# Patient Record
Sex: MALE | Race: WHITE | NOT HISPANIC OR LATINO
[De-identification: names, ages, dates, MRNs, and addresses within clinical notes are randomized per-mention and may not be internally consistent; named-entity substitution may affect disease eponyms.]

---

## 2017-04-26 ENCOUNTER — APPOINTMENT (OUTPATIENT)
Dept: CARDIOLOGY | Facility: CLINIC | Age: 77
End: 2017-04-26

## 2017-04-26 ENCOUNTER — NON-APPOINTMENT (OUTPATIENT)
Age: 77
End: 2017-04-26

## 2017-04-26 VITALS
WEIGHT: 176 LBS | DIASTOLIC BLOOD PRESSURE: 70 MMHG | BODY MASS INDEX: 23.33 KG/M2 | HEART RATE: 88 BPM | SYSTOLIC BLOOD PRESSURE: 128 MMHG | HEIGHT: 73 IN

## 2017-04-26 DIAGNOSIS — I47.2 VENTRICULAR TACHYCARDIA: ICD-10-CM

## 2017-10-18 ENCOUNTER — INPATIENT (INPATIENT)
Facility: HOSPITAL | Age: 77
LOS: 1 days | Discharge: HOME | DRG: 244 | End: 2017-10-20
Attending: INTERNAL MEDICINE | Admitting: INTERNAL MEDICINE
Payer: COMMERCIAL

## 2017-10-18 ENCOUNTER — APPOINTMENT (OUTPATIENT)
Dept: ELECTROPHYSIOLOGY | Facility: CLINIC | Age: 77
End: 2017-10-18
Payer: COMMERCIAL

## 2017-10-18 ENCOUNTER — APPOINTMENT (OUTPATIENT)
Dept: CARDIOLOGY | Facility: CLINIC | Age: 77
End: 2017-10-18
Payer: COMMERCIAL

## 2017-10-18 ENCOUNTER — NON-APPOINTMENT (OUTPATIENT)
Age: 77
End: 2017-10-18

## 2017-10-18 VITALS
HEART RATE: 58 BPM | HEIGHT: 73 IN | BODY MASS INDEX: 23.06 KG/M2 | SYSTOLIC BLOOD PRESSURE: 124 MMHG | WEIGHT: 174 LBS | DIASTOLIC BLOOD PRESSURE: 64 MMHG

## 2017-10-18 VITALS
SYSTOLIC BLOOD PRESSURE: 132 MMHG | OXYGEN SATURATION: 99 % | RESPIRATION RATE: 18 BRPM | DIASTOLIC BLOOD PRESSURE: 65 MMHG | TEMPERATURE: 98 F | HEART RATE: 49 BPM | WEIGHT: 173.94 LBS | HEIGHT: 73 IN

## 2017-10-18 VITALS — HEART RATE: 48 BPM | SYSTOLIC BLOOD PRESSURE: 134 MMHG | OXYGEN SATURATION: 99 % | DIASTOLIC BLOOD PRESSURE: 59 MMHG

## 2017-10-18 DIAGNOSIS — T82.111A BREAKDOWN (MECHANICAL) OF CARDIAC PULSE GENERATOR (BATTERY), INITIAL ENCOUNTER: ICD-10-CM

## 2017-10-18 LAB
ALBUMIN SERPL ELPH-MCNC: 4.5 G/DL — SIGNIFICANT CHANGE UP (ref 3.3–5)
ALP SERPL-CCNC: 64 U/L — SIGNIFICANT CHANGE UP (ref 40–120)
ALT FLD-CCNC: 37 U/L RC — SIGNIFICANT CHANGE UP (ref 10–45)
ANION GAP SERPL CALC-SCNC: 14 MMOL/L — SIGNIFICANT CHANGE UP (ref 5–17)
APTT BLD: 31.7 SEC — SIGNIFICANT CHANGE UP (ref 27.5–37.4)
AST SERPL-CCNC: 44 U/L — HIGH (ref 10–40)
BASOPHILS # BLD AUTO: 0.1 K/UL — SIGNIFICANT CHANGE UP (ref 0–0.2)
BASOPHILS NFR BLD AUTO: 1.1 % — SIGNIFICANT CHANGE UP (ref 0–2)
BILIRUB SERPL-MCNC: 1.5 MG/DL — HIGH (ref 0.2–1.2)
BLD GP AB SCN SERPL QL: NEGATIVE — SIGNIFICANT CHANGE UP
BUN SERPL-MCNC: 22 MG/DL — SIGNIFICANT CHANGE UP (ref 7–23)
CALCIUM SERPL-MCNC: 10.3 MG/DL — SIGNIFICANT CHANGE UP (ref 8.4–10.5)
CHLORIDE SERPL-SCNC: 102 MMOL/L — SIGNIFICANT CHANGE UP (ref 96–108)
CK MB CFR SERPL CALC: 2.4 NG/ML — SIGNIFICANT CHANGE UP (ref 0–6.7)
CK SERPL-CCNC: 72 U/L — SIGNIFICANT CHANGE UP (ref 30–200)
CO2 SERPL-SCNC: 24 MMOL/L — SIGNIFICANT CHANGE UP (ref 22–31)
CREAT SERPL-MCNC: 1.41 MG/DL — HIGH (ref 0.5–1.3)
EOSINOPHIL # BLD AUTO: 0.5 K/UL — SIGNIFICANT CHANGE UP (ref 0–0.5)
EOSINOPHIL NFR BLD AUTO: 7 % — HIGH (ref 0–6)
GLUCOSE SERPL-MCNC: 171 MG/DL — HIGH (ref 70–99)
HCT VFR BLD CALC: 43.3 % — SIGNIFICANT CHANGE UP (ref 39–50)
HGB BLD-MCNC: 14.8 G/DL — SIGNIFICANT CHANGE UP (ref 13–17)
INR BLD: 1.17 RATIO — HIGH (ref 0.88–1.16)
LYMPHOCYTES # BLD AUTO: 1.5 K/UL — SIGNIFICANT CHANGE UP (ref 1–3.3)
LYMPHOCYTES # BLD AUTO: 20.5 % — SIGNIFICANT CHANGE UP (ref 13–44)
MAGNESIUM SERPL-MCNC: 2 MG/DL — SIGNIFICANT CHANGE UP (ref 1.6–2.6)
MCHC RBC-ENTMCNC: 34.2 GM/DL — SIGNIFICANT CHANGE UP (ref 32–36)
MCHC RBC-ENTMCNC: 34.3 PG — HIGH (ref 27–34)
MCV RBC AUTO: 100 FL — SIGNIFICANT CHANGE UP (ref 80–100)
MONOCYTES # BLD AUTO: 0.6 K/UL — SIGNIFICANT CHANGE UP (ref 0–0.9)
MONOCYTES NFR BLD AUTO: 8.7 % — SIGNIFICANT CHANGE UP (ref 2–14)
NEUTROPHILS # BLD AUTO: 4.7 K/UL — SIGNIFICANT CHANGE UP (ref 1.8–7.4)
NEUTROPHILS NFR BLD AUTO: 62.8 % — SIGNIFICANT CHANGE UP (ref 43–77)
PHOSPHATE SERPL-MCNC: 3.4 MG/DL — SIGNIFICANT CHANGE UP (ref 2.5–4.5)
PLATELET # BLD AUTO: 94 K/UL — LOW (ref 150–400)
POTASSIUM SERPL-MCNC: 4.9 MMOL/L — SIGNIFICANT CHANGE UP (ref 3.5–5.3)
POTASSIUM SERPL-SCNC: 4.9 MMOL/L — SIGNIFICANT CHANGE UP (ref 3.5–5.3)
PROT SERPL-MCNC: 7.5 G/DL — SIGNIFICANT CHANGE UP (ref 6–8.3)
PROTHROM AB SERPL-ACNC: 12.8 SEC — HIGH (ref 9.8–12.7)
RBC # BLD: 4.31 M/UL — SIGNIFICANT CHANGE UP (ref 4.2–5.8)
RBC # FLD: 12 % — SIGNIFICANT CHANGE UP (ref 10.3–14.5)
RH IG SCN BLD-IMP: POSITIVE — SIGNIFICANT CHANGE UP
SODIUM SERPL-SCNC: 140 MMOL/L — SIGNIFICANT CHANGE UP (ref 135–145)
TROPONIN T SERPL-MCNC: 0.02 NG/ML — SIGNIFICANT CHANGE UP (ref 0–0.06)
WBC # BLD: 7.4 K/UL — SIGNIFICANT CHANGE UP (ref 3.8–10.5)
WBC # FLD AUTO: 7.4 K/UL — SIGNIFICANT CHANGE UP (ref 3.8–10.5)

## 2017-10-18 PROCEDURE — 99215 OFFICE O/P EST HI 40 MIN: CPT

## 2017-10-18 PROCEDURE — 93000 ELECTROCARDIOGRAM COMPLETE: CPT

## 2017-10-18 PROCEDURE — 99285 EMERGENCY DEPT VISIT HI MDM: CPT | Mod: 25

## 2017-10-18 PROCEDURE — 93282 PRGRMG EVAL IMPLANTABLE DFB: CPT

## 2017-10-18 PROCEDURE — 93010 ELECTROCARDIOGRAM REPORT: CPT

## 2017-10-18 RX ORDER — METOPROLOL TARTRATE 50 MG
100 TABLET ORAL DAILY
Qty: 0 | Refills: 0 | Status: DISCONTINUED | OUTPATIENT
Start: 2017-10-18 | End: 2017-10-20

## 2017-10-18 RX ORDER — CHLORHEXIDINE GLUCONATE 213 G/1000ML
1 SOLUTION TOPICAL
Qty: 0 | Refills: 0 | Status: DISCONTINUED | OUTPATIENT
Start: 2017-10-18 | End: 2017-10-20

## 2017-10-18 RX ORDER — METOPROLOL TARTRATE 50 MG
200 TABLET ORAL DAILY
Qty: 0 | Refills: 0 | Status: DISCONTINUED | OUTPATIENT
Start: 2017-10-18 | End: 2017-10-18

## 2017-10-18 RX ORDER — FUROSEMIDE 40 MG
20 TABLET ORAL DAILY
Qty: 0 | Refills: 0 | Status: DISCONTINUED | OUTPATIENT
Start: 2017-10-18 | End: 2017-10-20

## 2017-10-18 RX ORDER — DIGOXIN 250 MCG
125000 TABLET ORAL EVERY OTHER DAY
Qty: 0 | Refills: 0 | Status: DISCONTINUED | OUTPATIENT
Start: 2017-10-18 | End: 2017-10-18

## 2017-10-18 RX ORDER — ATORVASTATIN CALCIUM 80 MG/1
10 TABLET, FILM COATED ORAL AT BEDTIME
Qty: 0 | Refills: 0 | Status: DISCONTINUED | OUTPATIENT
Start: 2017-10-18 | End: 2017-10-20

## 2017-10-18 RX ORDER — GLIMEPIRIDE 1 MG
1 TABLET ORAL
Qty: 0 | Refills: 0 | COMMUNITY

## 2017-10-18 RX ADMIN — CHLORHEXIDINE GLUCONATE 1 APPLICATION(S): 213 SOLUTION TOPICAL at 20:28

## 2017-10-18 RX ADMIN — ATORVASTATIN CALCIUM 10 MILLIGRAM(S): 80 TABLET, FILM COATED ORAL at 20:28

## 2017-10-18 NOTE — ED ADULT NURSE NOTE - OBJECTIVE STATEMENT
77y male presents to the ED stating that his defibrillator and pacemaker batteries are dead. Pt is A/Ox3 stating that his cardiologist sent him here to get a new battery placed and to be admitted. Defib and pacemaker placed in 2001, with batteries replaced once before. Pts HR is in the 40s and irregular, MD Anne aware. Pt taking coumadin, has bilateral bruising on upper and lower extremities as well as sternum.  Pt has no complaints, denies chest pain and SOB, denies N/V/D, denies dizziness numbness and tingling. MD at bedside, will continue to reassess. 77y male presents to the ED stating that his defibrillator and pacemaker batteries are dead. Pt is A/Ox3 stating that his cardiologist sent him here to get a new battery placed and to be admitted. Defib and pacemaker placed in 2001, with batteries replaced once before. Pts HR is in the 40s and irregular, MD Anne aware. Pt taking coumadin, has bilateral bruising on upper and lower extremities as well as sternum.  Pt has no complaints, denies chest pain and SOB, denies N/V/D, denies dizziness numbness and tingling. Defib/pacemaker start beeping indicating the battery is dead, per patient. MD at bedside, will continue to reassess.

## 2017-10-18 NOTE — H&P ADULT - NSHPPHYSICALEXAM_GEN_ALL_CORE
PHYSICAL EXAMINATION:  Vital Signs Last 24 Hrs  T(C): 37 (18 Oct 2017 14:53), Max: 37 (18 Oct 2017 14:53)  T(F): 98.6 (18 Oct 2017 14:53), Max: 98.6 (18 Oct 2017 14:53)  HR: 48 (18 Oct 2017 15:41) (43 - 49)  BP: 137/71 (18 Oct 2017 15:41) (132/65 - 147/78)  BP(mean): --  RR: 16 (18 Oct 2017 15:41) (16 - 18)  SpO2: 100% (18 Oct 2017 15:41) (99% - 100%)  CAPILLARY BLOOD GLUCOSE          GENERAL: NAD, well-groomed, well-developed  HEAD:  atraumatic, normocephalic  EYES: sclera anicteric  ENMT: mucous membranes moist  NECK: supple, No JVD  CHEST/LUNG: clear to auscultation bilaterally; no rales, rhonchi, or wheezing b/l  HEART: normal S1, S2  ABDOMEN: BS+, soft, ND, NT   EXTREMITIES:  pulses palpable; no clubbing, cyanosis, or edema b/l LEs  NEURO: awake, alert, interactive; moves all extremities  SKIN: no rashes or lesions PHYSICAL EXAMINATION:  Vital Signs Last 24 Hrs  T(C): 37 (18 Oct 2017 14:53), Max: 37 (18 Oct 2017 14:53)  T(F): 98.6 (18 Oct 2017 14:53), Max: 98.6 (18 Oct 2017 14:53)  HR: 48 (18 Oct 2017 15:41) (43 - 49)  BP: 137/71 (18 Oct 2017 15:41) (132/65 - 147/78)  BP(mean): --  RR: 16 (18 Oct 2017 15:41) (16 - 18)  SpO2: 100% (18 Oct 2017 15:41) (99% - 100%)  CAPILLARY BLOOD GLUCOSE          GENERAL: NAD, comfortable in ER. Pacemaker shows pacing spikes on monitor.   HEAD:  atraumatic, normocephalic  EYES: sclera anicteric  ENMT: mucous membranes moist  NECK: supple, No JVD  CHEST/LUNG: clear to auscultation bilaterally; no rales, rhonchi, or wheezing b/l  HEART: normal S1, S2  ABDOMEN: BS+, soft, ND, NT   EXTREMITIES:  pulses palpable; no clubbing, cyanosis, 1 plus bilateral leg edema. No celluitis  NEURO: awake, alert, interactive; moves all extremities  SKIN: no rashes or lesions

## 2017-10-18 NOTE — ED PROVIDER NOTE - PSH
S/P CABG x 4  2/13/00  S/P ICD (internal cardiac defibrillator) procedure    S/P placement of cardiac pacemaker

## 2017-10-18 NOTE — H&P ADULT - NSHPLABSRESULTS_GEN_ALL_CORE
LABS:                        14.8   7.4   )-----------( 94       ( 18 Oct 2017 15:18 )             43.3     10-18    140  |  102  |  22  ----------------------------<  171<H>  4.9   |  24  |  1.41<H>    Ca    10.3      18 Oct 2017 15:18  Phos  3.4     10-18  Mg     2.0     10-18    TPro  7.5  /  Alb  4.5  /  TBili  1.5<H>  /  DBili  x   /  AST  44<H>  /  ALT  37  /  AlkPhos  64  10-18    PT/INR - ( 18 Oct 2017 15:18 )   PT: 12.8 sec;   INR: 1.17 ratio         PTT - ( 18 Oct 2017 15:18 )  PTT:31.7 sec        RADIOLOGY & ADDITIONAL TESTS:

## 2017-10-18 NOTE — ED ADULT NURSE NOTE - PMH
Atrial Fibrillation    CABG (Coronary Artery Bypass Graft)    CAD (Coronary Artery Disease)    Hyperlipidemia    ICD (Implantable Cardiac Defibrillator) in Place    Ischemic Cardiomyopathy    Ventricular Arrhythmia

## 2017-10-18 NOTE — H&P ADULT - HISTORY OF PRESENT ILLNESS
77 year old male Coumadin PMH PPM, AFib, CAD, hyperlipidemia, ischemic cardiomyopathy and pshx of CABG, ICD presents with evaluation of pacemaker. Patient states pacemaker's battery and wire are dead and needs replacement. Went to cardiologist for his pacemaker and had Dr. Santana send him to ER. No chest pain, palpitations or syncope. No other complaints at this time. 77 year old male Coumadin PMH PPM, AFib, CAD, hyperlipidemia, ischemic cardiomyopathy and pshx of CABG, ICD presents with evaluation of pacemaker. Patient states pacemaker's battery  needs replacement. Went to cardiologist for his pacemaker and had Dr. Reaves send him to ER.     No syncope, HA, chest pain, palpitations, fever, cough or melena. No other complaints at this time.

## 2017-10-18 NOTE — ED ADULT TRIAGE NOTE - CHIEF COMPLAINT QUOTE
battery and wire of defibrillator to be replace battery and wire of defibrillator to be replace, denies chest pain/palpitation

## 2017-10-18 NOTE — ED PROVIDER NOTE - MEDICAL DECISION MAKING DETAILS
attending Dayana: 77yM on A fib Coumadin, CAD, HLD, ischemic CM, sent in by EP for AICD malfunction. Pacer found not to be functioning during routine visit. On exam, bradycardic, normotensive, mentating, lungs clear, 2+ pedal edema b/l L. Will obtain ekg, place on tele, place pacer pads, obtain preop labs, EP consultation and tele admission.

## 2017-10-18 NOTE — H&P ADULT - ASSESSMENT
77 year old male Coumadin PMH PPM, AFib, CAD, hyperlipidemia, ischemic cardiomyopathy and pshx of CABG, ICD presents with evaluation of pacemaker. Patient states pacemaker's battery and wire are dead and needs replacement. Went to cardiologist for his pacemaker and had Dr. Santana send him to ER. No chest pain, palpitations or syncope. No other complaints at this time.    CV: 77 year old male Coumadin PMH PPM, AFib, CAD, hyperlipidemia, ischemic cardiomyopathy and pshx of CABG, ICD presents with evaluation of pacemaker. Patient states pacemaker's battery and wire are dead and needs replacement. Went to cardiologist for his pacemaker and had Dr. Santana send him to ER.     CV: Admit to telemetry. R-2 pads placed. Hold Coumadin, Digoxin. Decrease Toprol to 100 mg/day. continue Lasix 20 mg/day and aspirin 81 mg/day. EP called to generator change in AM. NPO after midnight.

## 2017-10-18 NOTE — ED ADULT NURSE NOTE - CHPI ED SYMPTOMS NEG
no back pain/no chest pain/no syncope/no diaphoresis/no shortness of breath/no dizziness/no fever/no vomiting/no cough/no chills/no nausea

## 2017-10-18 NOTE — ED PROVIDER NOTE - OBJECTIVE STATEMENT
77 year old male with pmhx of pacemaker, AFib, CAD, hyperlipidemia, ischemic cardiomyopathy and pshx of CABG, ICD presents with evaluation of pacemaker. Patient states pacemaker's battery and wire are dead and needs replacement. 77yM on Coumadin with pmhx of pacemaker, AFib, CAD, hyperlipidemia, ischemic cardiomyopathy and pshx of CABG, ICD presents with evaluation of pacemaker. Patient states pacemaker's battery and wire are dead and needs replacement. Went to cardiologist for his pacemaker and had Dr. Santana send him to ER. No chest pain or palpitations. No other complaints at this time.

## 2017-10-19 DIAGNOSIS — I25.5 ISCHEMIC CARDIOMYOPATHY: ICD-10-CM

## 2017-10-19 DIAGNOSIS — T82.110A BREAKDOWN (MECHANICAL) OF CARDIAC ELECTRODE, INITIAL ENCOUNTER: ICD-10-CM

## 2017-10-19 DIAGNOSIS — I48.91 UNSPECIFIED ATRIAL FIBRILLATION: ICD-10-CM

## 2017-10-19 LAB
ANION GAP SERPL CALC-SCNC: 12 MMOL/L — SIGNIFICANT CHANGE UP (ref 5–17)
APTT BLD: 31.3 SEC — SIGNIFICANT CHANGE UP (ref 27.5–37.4)
BUN SERPL-MCNC: 20 MG/DL — SIGNIFICANT CHANGE UP (ref 7–23)
CALCIUM SERPL-MCNC: 9.5 MG/DL — SIGNIFICANT CHANGE UP (ref 8.4–10.5)
CHLORIDE SERPL-SCNC: 106 MMOL/L — SIGNIFICANT CHANGE UP (ref 96–108)
CO2 SERPL-SCNC: 23 MMOL/L — SIGNIFICANT CHANGE UP (ref 22–31)
CREAT SERPL-MCNC: 1.21 MG/DL — SIGNIFICANT CHANGE UP (ref 0.5–1.3)
GLUCOSE SERPL-MCNC: 102 MG/DL — HIGH (ref 70–99)
HCT VFR BLD CALC: 36.1 % — LOW (ref 39–50)
HGB BLD-MCNC: 13 G/DL — SIGNIFICANT CHANGE UP (ref 13–17)
INR BLD: 1.18 RATIO — HIGH (ref 0.88–1.16)
MCHC RBC-ENTMCNC: 35.8 PG — HIGH (ref 27–34)
MCHC RBC-ENTMCNC: 36.1 GM/DL — HIGH (ref 32–36)
MCV RBC AUTO: 99.1 FL — SIGNIFICANT CHANGE UP (ref 80–100)
PLATELET # BLD AUTO: 83 K/UL — LOW (ref 150–400)
POTASSIUM SERPL-MCNC: 4 MMOL/L — SIGNIFICANT CHANGE UP (ref 3.5–5.3)
POTASSIUM SERPL-SCNC: 4 MMOL/L — SIGNIFICANT CHANGE UP (ref 3.5–5.3)
PROTHROM AB SERPL-ACNC: 12.9 SEC — HIGH (ref 9.8–12.7)
RBC # BLD: 3.64 M/UL — LOW (ref 4.2–5.8)
RBC # FLD: 11.9 % — SIGNIFICANT CHANGE UP (ref 10.3–14.5)
SODIUM SERPL-SCNC: 141 MMOL/L — SIGNIFICANT CHANGE UP (ref 135–145)
WBC # BLD: 5.8 K/UL — SIGNIFICANT CHANGE UP (ref 3.8–10.5)
WBC # FLD AUTO: 5.8 K/UL — SIGNIFICANT CHANGE UP (ref 3.8–10.5)

## 2017-10-19 PROCEDURE — 93010 ELECTROCARDIOGRAM REPORT: CPT

## 2017-10-19 PROCEDURE — 71010: CPT | Mod: 26

## 2017-10-19 PROCEDURE — 33249 INSJ/RPLCMT DEFIB W/LEAD(S): CPT | Mod: 59

## 2017-10-19 PROCEDURE — 33241 REMOVE PULSE GENERATOR: CPT

## 2017-10-19 RX ORDER — WARFARIN SODIUM 2.5 MG/1
5 TABLET ORAL ONCE
Qty: 0 | Refills: 0 | Status: COMPLETED | OUTPATIENT
Start: 2017-10-19 | End: 2017-10-19

## 2017-10-19 RX ORDER — CEFAZOLIN SODIUM 1 G
1000 VIAL (EA) INJECTION EVERY 8 HOURS
Qty: 0 | Refills: 0 | Status: DISCONTINUED | OUTPATIENT
Start: 2017-10-20 | End: 2017-10-20

## 2017-10-19 RX ADMIN — WARFARIN SODIUM 5 MILLIGRAM(S): 2.5 TABLET ORAL at 21:15

## 2017-10-19 RX ADMIN — CHLORHEXIDINE GLUCONATE 1 APPLICATION(S): 213 SOLUTION TOPICAL at 05:25

## 2017-10-19 RX ADMIN — Medication 100 MILLIGRAM(S): at 23:39

## 2017-10-19 RX ADMIN — ATORVASTATIN CALCIUM 10 MILLIGRAM(S): 80 TABLET, FILM COATED ORAL at 21:15

## 2017-10-19 RX ADMIN — Medication 20 MILLIGRAM(S): at 05:25

## 2017-10-19 NOTE — PATIENT PROFILE ADULT. - FUNCTIONAL SCREEN CURRENT LEVEL: BATHING, MLM
S/P exploratory laparotomy  (2011)  S/P hernia surgery  (2014)  S/P sinus surgery    Status post total left knee replacement (0) independent

## 2017-10-19 NOTE — PROGRESS NOTE ADULT - ASSESSMENT
77 year old male Coumadin PMH PPM, AFib, CAD, hyperlipidemia, ischemic cardiomyopathy and pshx of CABG, ICD presents with evaluation of pacemaker. Patient states pacemaker's battery and wire are dead and needs replacement. Went to cardiologist for his pacemaker and had Dr. Santana send him to ER.     CV: Admit to telemetry. R-2 pads placed. Hold Coumadin, Digoxin. Decrease Toprol to 100 mg/day. continue Lasix 20 mg/day and aspirin 81 mg/day. EP called to generator change in AM. NPO after midnight. 77 year old male Coumadin PMH PPM, AFib, CAD, hyperlipidemia, ischemic cardiomyopathy and pshx of CABG, ICD presents with evaluation of pacemaker. Patient states pacemaker's battery and wire are dead and needs replacement. Went to cardiologist for his pacemaker and had Dr. Santana send him to ER.     CV: Admit to telemetry. R-2 pads placed. Hold Coumadin, Digoxin. Decrease Toprol to 100 mg/day. Continue Lasix 20 mg/day and aspirin 81 mg/day. EP called to generator change today. NPO this AM.     Derm: Called to eval non-healing skin lesion on chest wall r/o squamous cell cancer. Will need skin biopsy hopefully while off coumadin.

## 2017-10-19 NOTE — PROGRESS NOTE ADULT - PROBLEM SELECTOR PLAN 3
-May dose Coumadin today[place] [id number] -May dose Coumadin today.  -No Heparin or Lovenox bridge post ICD implant.     MORELIA [place], [place] [id number]

## 2017-10-19 NOTE — PROVIDER CONTACT NOTE (OTHER) - BACKGROUND
Pacemaker malfunction. PMH of CAD, CABG, AFib, and Ischemic Cardiomyopathy. Pt previously had bradycardia with a HR of 34 at bedtime and in the ED.

## 2017-10-19 NOTE — PROGRESS NOTE ADULT - SUBJECTIVE AND OBJECTIVE BOX
CC/F/U for: pacemaker generator change.     INTERVAL HPI/OVERNIGHT EVENTS:  Pt seen and examined at bedside.     Allergies/Intolerance: No Known Allergies      MEDICATIONS  (STANDING):  atorvastatin 10 milliGRAM(s) Oral at bedtime  chlorhexidine 4% Liquid 1 Application(s) Topical two times a day  furosemide    Tablet 20 milliGRAM(s) Oral daily  metoprolol succinate  milliGRAM(s) Oral daily    MEDICATIONS  (PRN):        ROS: all systems reviewed and wnl      PHYSICAL EXAMINATION:  Vital Signs Last 24 Hrs  T(C): 36.9 (19 Oct 2017 04:36), Max: 37 (18 Oct 2017 14:53)  T(F): 98.5 (19 Oct 2017 04:36), Max: 98.6 (18 Oct 2017 14:53)  HR: 50 (19 Oct 2017 04:36) (43 - 70)  BP: 118/61 (19 Oct 2017 04:36) (118/61 - 155/71)  BP(mean): --  RR: 18 (19 Oct 2017 04:36) (16 - 19)  SpO2: 95% (19 Oct 2017 04:36) (95% - 100%)  CAPILLARY BLOOD GLUCOSE            GENERAL:   NECK: supple, No JVD  CHEST/LUNG: clear to auscultation bilaterally; no rales, rhonchi, or wheezing b/l  HEART: normal S1, S2  ABDOMEN: BS+, soft, ND, NT   EXTREMITIES:  pulses palpable; no clubbing, cyanosis, or edema b/l LEs  NEURO: awake, alert, interactive; moves all extremities  SKIN: no rashes or lesions      LABS:                        13.0   5.8   )-----------( 83       ( 19 Oct 2017 06:27 )             36.1     10-19    141  |  106  |  20  ----------------------------<  102<H>  4.0   |  23  |  1.21    Ca    9.5      19 Oct 2017 06:27  Phos  3.4     10-18  Mg     2.0     10-18    TPro  7.5  /  Alb  4.5  /  TBili  1.5<H>  /  DBili  x   /  AST  44<H>  /  ALT  37  /  AlkPhos  64  10-18    PT/INR - ( 19 Oct 2017 06:27 )   PT: 12.9 sec;   INR: 1.18 ratio         PTT - ( 19 Oct 2017 06:27 )  PTT:31.3 sec CC/F/U for: pacemaker generator change.     INTERVAL HPI/OVERNIGHT EVENTS:  Pt seen and examined at bedside.     Allergies/Intolerance: No Known Allergies      MEDICATIONS  (STANDING):  atorvastatin 10 milliGRAM(s) Oral at bedtime  chlorhexidine 4% Liquid 1 Application(s) Topical two times a day  furosemide    Tablet 20 milliGRAM(s) Oral daily  metoprolol succinate  milliGRAM(s) Oral daily    MEDICATIONS  (PRN):        ROS: all systems reviewed and wnl      PHYSICAL EXAMINATION:  Vital Signs Last 24 Hrs  T(C): 36.9 (19 Oct 2017 04:36), Max: 37 (18 Oct 2017 14:53)  T(F): 98.5 (19 Oct 2017 04:36), Max: 98.6 (18 Oct 2017 14:53)  HR: 50 (19 Oct 2017 04:36) (43 - 70)  BP: 118/61 (19 Oct 2017 04:36) (118/61 - 155/71)  BP(mean): --  RR: 18 (19 Oct 2017 04:36) (16 - 19)  SpO2: 95% (19 Oct 2017 04:36) (95% - 100%)  CAPILLARY BLOOD GLUCOSE            GENERAL: stable on 3-DSU, ambulates on floor, no syncope or CP.   NECK: supple, No JVD  CHEST/LUNG: clear to auscultation bilaterally; no rales, rhonchi, or wheezing b/l  HEART: normal S1, S2  ABDOMEN: BS+, soft, ND, NT   EXTREMITIES:  pulses palpable; no clubbing, cyanosis, or edema b/l LEs  NEURO: awake, alert, interactive; moves all extremities  SKIN: no rashes or lesions      LABS:                        13.0   5.8   )-----------( 83       ( 19 Oct 2017 06:27 )             36.1     10-19    141  |  106  |  20  ----------------------------<  102<H>  4.0   |  23  |  1.21    Ca    9.5      19 Oct 2017 06:27  Phos  3.4     10-18  Mg     2.0     10-18    TPro  7.5  /  Alb  4.5  /  TBili  1.5<H>  /  DBili  x   /  AST  44<H>  /  ALT  37  /  AlkPhos  64  10-18    PT/INR - ( 19 Oct 2017 06:27 )   PT: 12.9 sec;   INR: 1.18 ratio         PTT - ( 19 Oct 2017 06:27 )  PTT:31.3 sec

## 2017-10-19 NOTE — PROGRESS NOTE ADULT - ASSESSMENT
A/P: Rodent ulcer at level of right clavicular head,. Must r/o basal Cell Skin Cancer. Biopsy strongly advised to patient. PPM procedure is currently pending.   Aquacel to wound   Discussed w/ pt importance of bx and that now is a good time to bx as pt is currently off coumadin, pt hesitant.   con't Nutrition (as tolerated)  con't Offloading   con't Pericare  upon discharge f/u as outpatient at [facility] [address] [facility] [phone]  Care as per medicine will follow w/ you  Seen w/ attng & d/w Medicine

## 2017-10-19 NOTE — PROGRESS NOTE ADULT - SUBJECTIVE AND OBJECTIVE BOX
INTERVAL HPI/OVERNIGHT EVENTS: no acute events overnight     MEDICATIONS  (STANDING):  atorvastatin 10 milliGRAM(s) Oral at bedtime  chlorhexidine 4% Liquid 1 Application(s) Topical two times a day  furosemide    Tablet 20 milliGRAM(s) Oral daily  metoprolol succinate  milliGRAM(s) Oral daily    Allergies: No Known Allergies      ROS:  General: Pt denies recent weight loss/fever/chills    Neurological: denies numbness or  sensation loss    HEENT: denies visual changes, no hearing loss, denies sore throat    Cardiovascular: denies chest pain/palpitations/leg edema    Respiratory and Thorax: denies SOB/cough/wheezing    Gastrointestinal: denies abdominal pain/diarrhea/constipation/bloody stool    Genitourinary: denies urinary frequency/urgency/ dysuria    Musculoskeletal: denies joint pain or swelling, denies restricted motion    Skin: denies rashes/sores    Vital Signs Last 24 Hrs  T(C): 36.9 (19 Oct 2017 04:36), Max: 37 (18 Oct 2017 14:53)  T(F): 98.5 (19 Oct 2017 04:36), Max: 98.6 (18 Oct 2017 14:53)  HR: 50 (19 Oct 2017 04:36) (43 - 70)  BP: 118/61 (19 Oct 2017 04:36) (118/61 - 155/71)  RR: 18 (19 Oct 2017 04:36) (16 - 19)  SpO2: 95% (19 Oct 2017 04:36) (95% - 100%)      Physical Exam:    General: NAD, resting in bed    Neurological: Alert & Oriented x 3    HEENT: NC/AT, PERRLA, EOMI,  Neck supple.    Respiratory: CTA B/L, No wheezing/crackles/rhonchi    Cardiovascular: (+) S1 & S2, Irreg Irreg     Gastrointestinal: soft, NT, nondistended, (+) BS    Extremities: B/L LE 1+ edema, No clubbing, No cyanosis    Skin:  normal skin color and pigmentation      LABS:                        13.0   5.8   )-----------( 83       ( 19 Oct 2017 06:27 )             36.1     10-19    141  |  106  |  20  ----------------------------<  102<H>  4.0   |  23  |  1.21    Ca    9.5      19 Oct 2017 06:27  Phos  3.4     10-18  Mg     2.0     10-18    TPro  7.5  /  Alb  4.5  /  TBili  1.5<H>  /  DBili  x   /  AST  44<H>  /  ALT  37  /  AlkPhos  64  10-18    PT/INR - ( 19 Oct 2017 06:27 )   PT: 12.9 sec;   INR: 1.18 ratio         PTT - ( 19 Oct 2017 06:27 )  PTT:31.3 sec      TELE: AFib/ V pace, intermittent failure to capture noted on Tele

## 2017-10-19 NOTE — PROGRESS NOTE ADULT - SUBJECTIVE AND OBJECTIVE BOX
Wound SURGERY CONSULT NOTE    FROM:   FOR:   Reason for Consult:    HPI:  77 year old male Coumadin PMH PPM, AFib, CAD, hyperlipidemia, ischemic cardiomyopathy and pshx of CABG, ICD presents with evaluation of pacemaker. Patient states pacemaker's battery  needs replacement. Went to cardiologist for his pacemaker and had Dr. Reaves send him to ER.     No syncope, HA, chest pain, palpitations, fever, cough or melena. No other complaints at this time. (18 Oct 2017 16:40)      PAST MEDICAL & SURGICAL HISTORY:  Ventricular Arrhythmia  ICD (Implantable Cardiac Defibrillator) in Place  Hyperlipidemia  Ischemic Cardiomyopathy  CAD (Coronary Artery Disease)  CABG (Coronary Artery Bypass Graft)  Atrial Fibrillation  S/P ICD (internal cardiac defibrillator) procedure  S/P placement of cardiac pacemaker  S/P CABG x 4: 2/13/00      REVIEW OF SYSTEMS      General:	    Skin/Breast:  	  Ophthalmologic:  	  ENMT:	    Respiratory and Thorax: chronic wound of chest wall, per patient, related to injury on car door  	  Cardiovascular:	    Gastrointestinal:	    Genitourinary:	    Musculoskeletal:	    Neurological:	    Psychiatric:	    Hematology/Lymphatics:	    Endocrine:	    Allergic/Immunologic:	    MEDICATIONS  (STANDING):  atorvastatin 10 milliGRAM(s) Oral at bedtime  chlorhexidine 4% Liquid 1 Application(s) Topical two times a day  furosemide    Tablet 20 milliGRAM(s) Oral daily  metoprolol succinate  milliGRAM(s) Oral daily  warfarin 5 milliGRAM(s) Oral once    MEDICATIONS  (PRN):      Allergies    No Known Allergies    Intolerances        SOCIAL HISTORY:    FAMILY HISTORY:  No pertinent family history in first degree relatives      Vital Signs Last 24 Hrs  T(C): 36.4 (19 Oct 2017 18:15), Max: 36.9 (19 Oct 2017 04:36)  T(F): 97.5 (19 Oct 2017 18:15), Max: 98.5 (19 Oct 2017 04:36)  HR: 66 (19 Oct 2017 18:15) (50 - 70)  BP: 137/67 (19 Oct 2017 18:15) (118/61 - 155/71)  BP(mean): --  RR: 18 (19 Oct 2017 18:15) (18 - 19)  SpO2: 96% (19 Oct 2017 18:15) (95% - 96%)    PHYSICAL EXAM:      Constitutional: NAD, Alert, Ambulatory    Eyes:    ENMT:    Neck:    Breasts:    Back:    Respiratory:    Cardiovascular:    Gastrointestinal:    Genitourinary:    Rectal:    Extremities:    Vascular:    Neurological:    Skin:    Lymph Nodes:    Musculoskeletal:    Psychiatric:      Rodent ulcer at level of right clavicular head,. Must r/o basal Cell Skin Cancer. Biopsy strongly advised to patient. PPM procedure is currently pending.    LABS:                        13.0   5.8   )-----------( 83       ( 19 Oct 2017 06:27 )             36.1     10-19    141  |  106  |  20  ----------------------------<  102<H>  4.0   |  23  |  1.21    Ca    9.5      19 Oct 2017 06:27  Phos  3.4     10-18  Mg     2.0     10-18    TPro  7.5  /  Alb  4.5  /  TBili  1.5<H>  /  DBili  x   /  AST  44<H>  /  ALT  37  /  AlkPhos  64  10-18    PT/INR - ( 19 Oct 2017 06:27 )   PT: 12.9 sec;   INR: 1.18 ratio         PTT - ( 19 Oct 2017 06:27 )  PTT:31.3 sec      Albumin, Serum: 4.5 g/dL (10-18 @ 15:18)      HgA1c      RADIOLOGY & ADDITIONAL STUDIES:  Cultures:    A/P:    con't Nutrition (as tolerated)  con't Offloading   con't Pericare  f/u as outpatient at [facility] [address] [place] [address] [phone]  Care as per medicine will follow w/ you Wound SURGERY CONSULT NOTE    FROM: dr hinds  FOR: dr bran  Reason for Consult:chest wound      HPI:  77 year old male Coumadin PMH PPM, AFib, CAD, hyperlipidemia, ischemic cardiomyopathy and pshx of CABG, ICD presents with evaluation of pacemaker. Patient states pacemaker's battery  needs replacement. Went to cardiologist for his pacemaker and had Dr. Reaves send him to ER.  No syncope, HA, chest pain, palpitations, fever, cough or melena. No other complaints at this time.  Pt for OR today .  UPon admission pt noted w/ ulcer in upper chest.  Pt notes that it comes from banging on car door.  It gets better and gets worse. He's had for about 1yr.  Pt usually wears a band aid.  no complaints of drainage, bleeding -pt has been off coumadin for one week.   no fevers.  He said his PMD has taken scrappings and hasn't said it's anything to worry about.  No c/o odor, redness, or increased warmth.  Discussed w/ pt importance of bx as this is nonhealing wound.  Pt nonchalant, 'its from my car door'.       PAST MEDICAL & SURGICAL HISTORY:  Ventricular Arrhythmia  ICD (Implantable Cardiac Defibrillator) in Place  Hyperlipidemia  Ischemic Cardiomyopathy  CAD (Coronary Artery Disease)  Atrial Fibrillation  S/P ICD (internal cardiac defibrillator) procedure  S/P placement of cardiac pacemaker  S/P CABG x 4: 2/13/00      REVIEW OF SYSTEMS    Skin/Respiratory and Thorax: chronic wound of chest wall, per patient, related to injury on car door  All other systems negative    MEDICATIONS  (STANDING):  atorvastatin 10 milliGRAM(s) Oral at bedtime  chlorhexidine 4% Liquid 1 Application(s) Topical two times a day  furosemide    Tablet 20 milliGRAM(s) Oral daily  metoprolol succinate  milliGRAM(s) Oral daily  warfarin 5 milliGRAM(s) Oral once    No known Allergies    SOCIAL HISTORY:  , CPA    FAMILY HISTORY:  No pertinent family history in first degree relatives      Vital Signs Last 24 Hrs  T(C): 36.4 (19 Oct 2017 18:15), Max: 36.9 (19 Oct 2017 04:36)  T(F): 97.5 (19 Oct 2017 18:15), Max: 98.5 (19 Oct 2017 04:36)  HR: 66 (19 Oct 2017 18:15) (50 - 70)  BP: 137/67 (19 Oct 2017 18:15) (118/61 - 155/71)  BP(mean): --  RR: 18 (19 Oct 2017 18:15) (18 - 19)  SpO2: 96% (19 Oct 2017 18:15) (95% - 96%)    PHYSICAL EXAM:    Constitutional: NAD, Alert, Ambulatory  Versa care P500 bed    Respiratory: CTA    Cardiovascular: RRR paced    Gastrointestinal: soft NT/ ND (+)BS    Extremities/ Vascular:  FROM x4 equally warm, no edema, deformiities, nor contractures    Neurological: sensation and strength grossly intact    Skin: Moist w/ good turgor  Ulcer to Sternal Noch/Mid-upper chest  more right of center  1.5cm x 2.5cm x 0.5cm w/ moist granular friable wound bed  Stopped bleeding w/ gentle pressure  No odor, tender, odor, pain, induration, nor fluctuance      LABS:                        13.0   5.8   )-----------( 83       ( 19 Oct 2017 06:27 )             36.1     10-19    141  |  106  |  20  ----------------------------<  102<H>  4.0   |  23  |  1.21    Ca    9.5      19 Oct 2017 06:27  Phos  3.4     10-18  Mg     2.0     10-18    TPro  7.5  /  Alb  4.5  /  TBili  1.5<H>  /  DBili  x   /  AST  44<H>  /  ALT  37  /  AlkPhos  64  10-18    PT/INR - ( 19 Oct 2017 06:27 )   PT: 12.9 sec;   INR: 1.18 ratio    PTT - ( 19 Oct 2017 06:27 )  PTT:31.3 sec

## 2017-10-19 NOTE — PROGRESS NOTE ADULT - ASSESSMENT
77 year old male Coumadin PMH PPM, AFib (on Coumadin), CABG, CAD, hyperlipidemia, ischemic cardiomyopathy, s/p ICD ([employer]). sent to ER after seen in EP clinic with ICD at elective replacement indicator (THREON) and RV lead malfunction with impedance >3000 ohms and elevated RV thresholds.

## 2017-10-20 ENCOUNTER — TRANSCRIPTION ENCOUNTER (OUTPATIENT)
Age: 77
End: 2017-10-20

## 2017-10-20 VITALS
SYSTOLIC BLOOD PRESSURE: 116 MMHG | DIASTOLIC BLOOD PRESSURE: 55 MMHG | OXYGEN SATURATION: 96 % | HEART RATE: 62 BPM | RESPIRATION RATE: 18 BRPM | TEMPERATURE: 98 F

## 2017-10-20 LAB
ANION GAP SERPL CALC-SCNC: 18 MMOL/L — HIGH (ref 5–17)
APPEARANCE UR: CLEAR — SIGNIFICANT CHANGE UP
BILIRUB UR-MCNC: NEGATIVE — SIGNIFICANT CHANGE UP
BUN SERPL-MCNC: 24 MG/DL — HIGH (ref 7–23)
CALCIUM SERPL-MCNC: 9.4 MG/DL — SIGNIFICANT CHANGE UP (ref 8.4–10.5)
CHLORIDE SERPL-SCNC: 102 MMOL/L — SIGNIFICANT CHANGE UP (ref 96–108)
CO2 SERPL-SCNC: 21 MMOL/L — LOW (ref 22–31)
COLOR SPEC: YELLOW — SIGNIFICANT CHANGE UP
CREAT SERPL-MCNC: 1.5 MG/DL — HIGH (ref 0.5–1.3)
DIFF PNL FLD: NEGATIVE — SIGNIFICANT CHANGE UP
GLUCOSE BLDC GLUCOMTR-MCNC: 211 MG/DL — HIGH (ref 70–99)
GLUCOSE SERPL-MCNC: 266 MG/DL — HIGH (ref 70–99)
GLUCOSE UR QL: 150
HBA1C BLD-MCNC: 8.5 % — HIGH (ref 4–5.6)
HCT VFR BLD CALC: 40.3 % — SIGNIFICANT CHANGE UP (ref 39–50)
HGB BLD-MCNC: 13.1 G/DL — SIGNIFICANT CHANGE UP (ref 13–17)
INR BLD: 1.36 RATIO — HIGH (ref 0.88–1.16)
KETONES UR-MCNC: ABNORMAL
LEUKOCYTE ESTERASE UR-ACNC: NEGATIVE — SIGNIFICANT CHANGE UP
MCHC RBC-ENTMCNC: 32.6 GM/DL — SIGNIFICANT CHANGE UP (ref 32–36)
MCHC RBC-ENTMCNC: 32.6 PG — SIGNIFICANT CHANGE UP (ref 27–34)
MCV RBC AUTO: 100 FL — SIGNIFICANT CHANGE UP (ref 80–100)
NITRITE UR-MCNC: NEGATIVE — SIGNIFICANT CHANGE UP
PH UR: 5.5 — SIGNIFICANT CHANGE UP (ref 5–8)
PLATELET # BLD AUTO: 99 K/UL — LOW (ref 150–400)
POTASSIUM SERPL-MCNC: 4.9 MMOL/L — SIGNIFICANT CHANGE UP (ref 3.5–5.3)
POTASSIUM SERPL-SCNC: 4.9 MMOL/L — SIGNIFICANT CHANGE UP (ref 3.5–5.3)
PROT UR-MCNC: SIGNIFICANT CHANGE UP
PROTHROM AB SERPL-ACNC: 14.8 SEC — HIGH (ref 9.8–12.7)
RBC # BLD: 4.03 M/UL — LOW (ref 4.2–5.8)
RBC # FLD: 12 % — SIGNIFICANT CHANGE UP (ref 10.3–14.5)
SODIUM SERPL-SCNC: 141 MMOL/L — SIGNIFICANT CHANGE UP (ref 135–145)
SP GR SPEC: 1.02 — SIGNIFICANT CHANGE UP (ref 1.01–1.02)
UROBILINOGEN FLD QL: NEGATIVE — SIGNIFICANT CHANGE UP
WBC # BLD: 16.2 K/UL — HIGH (ref 3.8–10.5)
WBC # FLD AUTO: 16.2 K/UL — HIGH (ref 3.8–10.5)

## 2017-10-20 PROCEDURE — 86850 RBC ANTIBODY SCREEN: CPT

## 2017-10-20 PROCEDURE — 84484 ASSAY OF TROPONIN QUANT: CPT

## 2017-10-20 PROCEDURE — 83036 HEMOGLOBIN GLYCOSYLATED A1C: CPT

## 2017-10-20 PROCEDURE — 71045 X-RAY EXAM CHEST 1 VIEW: CPT

## 2017-10-20 PROCEDURE — 84100 ASSAY OF PHOSPHORUS: CPT

## 2017-10-20 PROCEDURE — 70450 CT HEAD/BRAIN W/O DYE: CPT

## 2017-10-20 PROCEDURE — 93010 ELECTROCARDIOGRAM REPORT: CPT

## 2017-10-20 PROCEDURE — C1777: CPT

## 2017-10-20 PROCEDURE — 82553 CREATINE MB FRACTION: CPT

## 2017-10-20 PROCEDURE — C1721: CPT

## 2017-10-20 PROCEDURE — 85730 THROMBOPLASTIN TIME PARTIAL: CPT

## 2017-10-20 PROCEDURE — 33241 REMOVE PULSE GENERATOR: CPT

## 2017-10-20 PROCEDURE — 86900 BLOOD TYPING SEROLOGIC ABO: CPT

## 2017-10-20 PROCEDURE — 70450 CT HEAD/BRAIN W/O DYE: CPT | Mod: 26

## 2017-10-20 PROCEDURE — 81003 URINALYSIS AUTO W/O SCOPE: CPT

## 2017-10-20 PROCEDURE — 80048 BASIC METABOLIC PNL TOTAL CA: CPT

## 2017-10-20 PROCEDURE — 82962 GLUCOSE BLOOD TEST: CPT

## 2017-10-20 PROCEDURE — 82550 ASSAY OF CK (CPK): CPT

## 2017-10-20 PROCEDURE — 99285 EMERGENCY DEPT VISIT HI MDM: CPT | Mod: 25

## 2017-10-20 PROCEDURE — 86901 BLOOD TYPING SEROLOGIC RH(D): CPT

## 2017-10-20 PROCEDURE — 85027 COMPLETE CBC AUTOMATED: CPT

## 2017-10-20 PROCEDURE — 93005 ELECTROCARDIOGRAM TRACING: CPT

## 2017-10-20 PROCEDURE — 80053 COMPREHEN METABOLIC PANEL: CPT

## 2017-10-20 PROCEDURE — 85610 PROTHROMBIN TIME: CPT

## 2017-10-20 PROCEDURE — 33249 INSJ/RPLCMT DEFIB W/LEAD(S): CPT

## 2017-10-20 PROCEDURE — 83735 ASSAY OF MAGNESIUM: CPT

## 2017-10-20 PROCEDURE — C1894: CPT

## 2017-10-20 RX ORDER — METOPROLOL TARTRATE 50 MG
1 TABLET ORAL
Qty: 0 | Refills: 0 | DISCHARGE
Start: 2017-10-20

## 2017-10-20 RX ADMIN — Medication 20 MILLIGRAM(S): at 05:21

## 2017-10-20 RX ADMIN — Medication 100 MILLIGRAM(S): at 05:21

## 2017-10-20 RX ADMIN — Medication 100 MILLIGRAM(S): at 08:59

## 2017-10-20 NOTE — DISCHARGE NOTE ADULT - MEDICATION SUMMARY - MEDICATIONS TO CHANGE
I will SWITCH the dose or number of times a day I take the medications listed below when I get home from the hospital:    Toprol- mg oral tablet, extended release  -- 1 tab(s) by mouth once a day    Digitek  -- 125 milligram(s) by mouth every other day

## 2017-10-20 NOTE — CHART NOTE - NSCHARTNOTEFT_GEN_A_CORE
Pt woke up at 0420am, acted confused, and want to leave the hospital. Baseline Mental Status A&O x3.  Pt denies CP/SOB/ Palpitations/ Diaphoreses, HA/ Dizziness/ Blurry vision/ syncope, fever/ chills, Abdominal Pain/N/V/D/C, Orthopnea/ PND.     Vital Signs Last 24 Hrs  T(C): 36.7 (20 Oct 2017 04:22), Max: 37.1 (19 Oct 2017 21:28)  T(F): 98 (20 Oct 2017 04:22), Max: 98.7 (19 Oct 2017 21:28)  HR: 74 (20 Oct 2017 04:22) (50 - 74)  BP: 138/67 (20 Oct 2017 04:22) (109/60 - 138/67)  BP(mean): --  RR: 18 (20 Oct 2017 04:22) (18 - 18)  SpO2: 95% (20 Oct 2017 04:22) (94% - 96%)                          13.0   5.8   )-----------( 83       ( 19 Oct 2017 06:27 )             36.1     10-19    141  |  106  |  20  ----------------------------<  102<H>  4.0   |  23  |  1.21    Ca    9.5      19 Oct 2017 06:27  Phos  3.4     10-18  Mg     2.0     10-18    TPro  7.5  /  Alb  4.5  /  TBili  1.5<H>  /  DBili  x   /  AST  44<H>  /  ALT  37  /  AlkPhos  64  10-18    PT/INR - ( 19 Oct 2017 06:27 )   PT: 12.9 sec;   INR: 1.18 ratio         PTT - ( 19 Oct 2017 06:27 )  PTT:31.3 sec  CARDIAC MARKERS ( 18 Oct 2017 15:18 )  x     / 0.02 ng/mL / 72 U/L / x     / 2.4 ng/mL      A/P: 77 year old male Coumadin PMH PPM, AFib (on Coumadin), CABG, CAD, hyperlipidemia, ischemic cardiomyopathy, s/p ICD (Medtronic). sent to ER after seen in EP clinic with ICD at elective replacement indicator (THERON) and RV lead malfunction with s/p lead revision and generator change today noted to be confused.     # Altered Mental Status.   - Vitals Stable. Pt afebrile.   - STAT FS: 211.   - No Events on Tele.   - STAT UA sent f/u.   - Neuro checks: WNL.  - Will call Dr. Sibley in am abt CT Head.   - Returned back to Baseline after orienting pt.   - Will endorse to primary team in am.     MALAIKA. HERB MORAN.   # [id number].  Medicine PA. Pt woke up at 0420am, acted confused, and want to leave the hospital. Baseline Mental Status A&O x3.  Pt denies CP/SOB/ Palpitations/ Diaphoreses, HA/ Dizziness/ Blurry vision/ syncope, fever/ chills, Abdominal Pain/N/V/D/C, Orthopnea/ PND.     Vital Signs Last 24 Hrs  T(C): 36.7 (20 Oct 2017 04:22), Max: 37.1 (19 Oct 2017 21:28)  T(F): 98 (20 Oct 2017 04:22), Max: 98.7 (19 Oct 2017 21:28)  HR: 74 (20 Oct 2017 04:22) (50 - 74)  BP: 138/67 (20 Oct 2017 04:22) (109/60 - 138/67)  BP(mean): --  RR: 18 (20 Oct 2017 04:22) (18 - 18)  SpO2: 95% (20 Oct 2017 04:22) (94% - 96%)                          13.0   5.8   )-----------( 83       ( 19 Oct 2017 06:27 )             36.1     10-19    141  |  106  |  20  ----------------------------<  102<H>  4.0   |  23  |  1.21    Ca    9.5      19 Oct 2017 06:27  Phos  3.4     10-18  Mg     2.0     10-18    TPro  7.5  /  Alb  4.5  /  TBili  1.5<H>  /  DBili  x   /  AST  44<H>  /  ALT  37  /  AlkPhos  64  10-18    PT/INR - ( 19 Oct 2017 06:27 )   PT: 12.9 sec;   INR: 1.18 ratio         PTT - ( 19 Oct 2017 06:27 )  PTT:31.3 sec  CARDIAC MARKERS ( 18 Oct 2017 15:18 )  x     / 0.02 ng/mL / 72 U/L / x     / 2.4 ng/mL      A/P: 77 year old male PMH PPM, AFib (on Coumadin), CABG, CAD, hyperlipidemia, ischemic cardiomyopathy, s/p ICD (Medtronic). sent to ER after seen in EP clinic with ICD at elective replacement indicator (THERON) and RV lead malfunction with s/p lead revision and generator change on 10/19 noted to be confused.     # Altered Mental Status.   - Vitals Stable. Pt afebrile.   - STAT FS: 211.   - No Events on Tele.   - STAT UA sent f/u.   - Neuro checks: WNL.  - Will call Dr. Sibley in am abt CT Head.   - Returned back to Baseline after orienting pt.   - Will endorse to primary team in am.     MALAIKA. HERB MORAN.   # [id number].  Medicine PA.

## 2017-10-20 NOTE — DISCHARGE NOTE ADULT - MEDICATION SUMMARY - MEDICATIONS TO TAKE
I will START or STAY ON the medications listed below when I get home from the hospital:    Inspra 25 mg oral tablet  -- 1 tab(s) by mouth once a day  -- Indication: For cardiovascular agent     aspirin 81 mg oral tablet  -- 1 tab(s) by mouth once a day  -- Indication: For Antiplatelet     digoxin 125 mcg (0.125 mg) oral tablet  -- 1 tab(s) by mouth every other day  -- Indication: For CAD    Coumadin 4 mg oral tablet  -- 1 tab(s) by mouth once a day, except on sunday take 1.5 tabs.  -- Indication: For Afib    glimepiride 1 mg oral tablet  -- 1 tab(s) by mouth 2 times a day  -- Indication: For diabetes    Lipitor 10 mg oral tablet  -- 1 tab(s) by mouth once a day (at bedtime)  -- Indication: For cholestrol     metoprolol succinate 100 mg oral tablet, extended release  -- 1 tab(s) by mouth once a day  -- Indication: For CAD    furosemide 20 mg oral tablet  -- 1 tab(s) by mouth once a day  -- Indication: For CAD

## 2017-10-20 NOTE — DISCHARGE NOTE ADULT - HOSPITAL COURSE
77 year old male PMH of AFib on Coumadin PPM,  CAD, hyperlipidemia, ischemic cardiomyopathy and pshx of CABG, ICD (medtronic) sent to ER after seen in the EP clinic by Dr. Reaves .Patient was later sent to ER for ICD  elective replacement indicator (THERON) and RV lead malfunction with impedance >3000 ohms and elevated RV thresholds. Upon admission patient was noted w/ ulcer in upper chest.  Patient reports that it came from banging on car door 1yr ago . Patient was seen by wound care .Biopsy strongly advised to patient to r/o basal Cell Skin Cancer .Patient will follow up with  PCP and outpatient dermatologist for biopsy. 77 year old male PMH of AFib on Coumadin with PPM,  CAD, hyperlipidemia, ischemic cardiomyopathy and pshx of CABG, ICD (medtronic) sent to ER after seen in the EP clinic by Dr. Reaves .Patient was later sent to ER for ICD  elective replacement indicator (THERON) and RV lead malfunction with impedance >3000 ohms and elevated RV thresholds.     Upon admission patient was noted w/ ulcer in upper chest.  Patient reports that it came from banging on car door 1yr ago . Patient was seen by wound care .Biopsy strongly advised to patient to r/o basal Cell Skin Cancer .Patient will follow up with  PCP and outpatient dermatologist for skin biopsy. PPM battery and leads were changed. Discharged to home. Coumadin was resumed. See Derm later this week for skin biopsy.

## 2017-10-20 NOTE — PROVIDER CONTACT NOTE (OTHER) - ASSESSMENT
Pt is A&Ox4 at baseline. At time of incident pt was disoriented to place and situation. Pt believed he was at [facility] and had to go to a specific center. Pt VSS. Temp 98, HR 74, /67, RR 18, and SPO2 of 95% on RA. .

## 2017-10-20 NOTE — PROGRESS NOTE ADULT - PROBLEM SELECTOR PLAN 1
-Continue Tele monitor  -NPO for ICD generator change and RV lead revision today.
-S/P ICD generator change and RV lead revision 10/19  -Post procedure instructions provided to patient.  -Booklet/ ID Card/ Follow up appointment provided 11/10 at 12:45pm

## 2017-10-20 NOTE — PROGRESS NOTE ADULT - SUBJECTIVE AND OBJECTIVE BOX
INTERVAL HPI/OVERNIGHT EVENTS: PAtient resting comfortably in bed, denies c/o CP, palpitations or SOB.     MEDICATIONS  (STANDING):  atorvastatin 10 milliGRAM(s) Oral at bedtime  ceFAZolin   IVPB 1000 milliGRAM(s) IV Intermittent every 8 hours  chlorhexidine 4% Liquid 1 Application(s) Topical two times a day  furosemide    Tablet 20 milliGRAM(s) Oral daily  metoprolol succinate  milliGRAM(s) Oral daily    MEDICATIONS  (PRN):      Allergies    No Known Allergies    Intolerances      ROS:  General: Pt denies fever/chills    Cardiovascular: denies chest pain/palpitations/leg edema    Respiratory and Thorax: denies SOB/cough/wheezing    Gastrointestinal: denies abdominal pain/diarrhea/constipation/bloody stool    Musculoskeletal: denies joint pain or swelling, denies restricted motion    Skin: denies rashes/sores    Hematologic: denies abnormal bleeding    Vital Signs Last 24 Hrs  T(C): 36.6 (20 Oct 2017 13:05), Max: 37.1 (19 Oct 2017 21:28)  T(F): 97.9 (20 Oct 2017 13:05), Max: 98.7 (19 Oct 2017 21:28)  HR: 62 (20 Oct 2017 13:05) (61 - 74)  BP: 116/55 (20 Oct 2017 13:05) (109/60 - 138/67)  BP(mean): --  RR: 18 (20 Oct 2017 13:05) (18 - 18)  SpO2: 96% (20 Oct 2017 13:05) (94% - 96%)    Physical Exam:  Constitutional: well developed, well nourished,  and no acute distress    Neurological: Alert & Oriented x 3,  no focal deficits    HEENT:   Neck supple.    Respiratory: CTA B/L, No wheezing/crackles/rhonchi    Cardiovascular: (+) S1 & S2, RRR    Gastrointestinal: soft, NT, nondistended, (+) BS    Genitourinary: non distended bladder, voiding freely    Extremities: No pedal edema, No clubbing, No cyanosis    Skin:  normal skin color and pigmentation, no skin lesions          LABS:                        13.1   16.2  )-----------( 99       ( 20 Oct 2017 06:11 )             40.3     10-20    141  |  102  |  24<H>  ----------------------------<  266<H>  4.9   |  21<L>  |  1.50<H>    Ca    9.4      20 Oct 2017 06:11  Phos  3.4     10-  Mg     2.0     10-    TPro  7.5  /  Alb  4.5  /  TBili  1.5<H>  /  DBili  x   /  AST  44<H>  /  ALT  37  /  AlkPhos  64  10-18    PT/INR - ( 20 Oct 2017 06:11 )   PT: 14.8 sec;   INR: 1.36 ratio         PTT - ( 19 Oct 2017 06:27 )  PTT:31.3 sec  Urinalysis Basic - ( 20 Oct 2017 07:28 )    Color: Yellow / Appearance: Clear / S.020 / pH: x  Gluc: x / Ketone: Small  / Bili: Negative / Urobili: Negative   Blood: x / Protein: Trace / Nitrite: Negative   Leuk Esterase: Negative / RBC: x / WBC x   Sq Epi: x / Non Sq Epi: x / Bacteria: x        RADIOLOGY & ADDITIONAL TESTS:    TELE:    EKG: INTERVAL HPI/OVERNIGHT EVENTS: Patient resting comfortably in bed, denies c/o CP, palpitations or SOB.     MEDICATIONS  (STANDING):  atorvastatin 10 milliGRAM(s) Oral at bedtime  ceFAZolin   IVPB 1000 milliGRAM(s) IV Intermittent every 8 hours  chlorhexidine 4% Liquid 1 Application(s) Topical two times a day  furosemide    Tablet 20 milliGRAM(s) Oral daily  metoprolol succinate  milliGRAM(s) Oral daily    MEDICATIONS  (PRN):      Allergies    No Known Allergies      ROS:  General: Pt denies fever/chills    Cardiovascular: denies chest pain/palpitations/leg edema    Respiratory and Thorax: denies SOB/cough/wheezing    Gastrointestinal: denies abdominal pain/diarrhea/constipation/bloody stool    Musculoskeletal: denies joint pain or swelling, denies restricted motion    Skin: denies rashes/sores    Hematologic: denies abnormal bleeding    Vital Signs Last 24 Hrs  T(C): 36.6 (20 Oct 2017 13:05), Max: 37.1 (19 Oct 2017 21:28)  T(F): 97.9 (20 Oct 2017 13:05), Max: 98.7 (19 Oct 2017 21:28)  HR: 62 (20 Oct 2017 13:05) (61 - 74)  BP: 116/55 (20 Oct 2017 13:05) (109/60 - 138/67)  RR: 18 (20 Oct 2017 13:05) (18 - 18)  SpO2: 96% (20 Oct 2017 13:05) (94% - 96%)    Physical Exam:  Constitutional: well developed, well nourished,  and no acute distress    Neurological: Alert & Oriented x 3,  no focal deficits    HEENT:   Neck supple.    Respiratory: CTA B/L, No wheezing/crackles/rhonchi    Cardiovascular: (+) S1 & S2, RRR    Gastrointestinal: soft, NT, nondistended, (+) BS    Genitourinary: non distended bladder, voiding freely    Extremities: No pedal edema, No clubbing, No cyanosis    Skin:  normal skin color and pigmentation, no skin lesions. Left infraclavicular site with ecchymosis and small hematoma. Pressure dressing applied.          LABS:                        13.1   16.2  )-----------( 99       ( 20 Oct 2017 06:11 )             40.3     10-20    141  |  102  |  24<H>  ----------------------------<  266<H>  4.9   |  21<L>  |  1.50<H>    Ca    9.4      20 Oct 2017 06:11  Phos  3.4     10-18  Mg     2.0     10-18    TPro  7.5  /  Alb  4.5  /  TBili  1.5<H>  /  DBili  x   /  AST  44<H>  /  ALT  37  /  AlkPhos  64  10-18    PT/INR - ( 20 Oct 2017 06:11 )   PT: 14.8 sec;   INR: 1.36 ratio         PTT - ( 19 Oct 2017 06:27 )  PTT:31.3 sec          RADIOLOGY & ADDITIONAL TESTS: CXRAY: No pneumothorax.  Clear lungs.       TELE: AFib, V Paced 50's-70's

## 2017-10-20 NOTE — PROGRESS NOTE ADULT - SUBJECTIVE AND OBJECTIVE BOX
CC/F/U for: pacemaker wire battery and lead change    INTERVAL HPI/OVERNIGHT EVENTS:  Pt seen and examined at bedside.     Allergies/Intolerance: No Known Allergies      MEDICATIONS  (STANDING):  atorvastatin 10 milliGRAM(s) Oral at bedtime  ceFAZolin   IVPB 1000 milliGRAM(s) IV Intermittent every 8 hours  chlorhexidine 4% Liquid 1 Application(s) Topical two times a day  furosemide    Tablet 20 milliGRAM(s) Oral daily  metoprolol succinate  milliGRAM(s) Oral daily    MEDICATIONS  (PRN):        ROS: all systems reviewed and wnl      PHYSICAL EXAMINATION:  Vital Signs Last 24 Hrs  T(C): 36.6 (20 Oct 2017 13:05), Max: 37.1 (19 Oct 2017 21:28)  T(F): 97.9 (20 Oct 2017 13:05), Max: 98.7 (19 Oct 2017 21:28)  HR: 62 (20 Oct 2017 13:05) (61 - 74)  BP: 116/55 (20 Oct 2017 13:05) (109/60 - 138/67)  BP(mean): --  RR: 18 (20 Oct 2017 13:05) (18 - 18)  SpO2: 96% (20 Oct 2017 13:05) (94% - 96%)  CAPILLARY BLOOD GLUCOSE      POCT Blood Glucose.: 211 mg/dL (20 Oct 2017 04:34)      10-19 @ :  -  10-20 @ 07:00  --------------------------------------------------------  IN: 50 mL / OUT: 250 mL / NET: -200 mL    10-20 @ :  -  10-20 @ 14:18  --------------------------------------------------------  IN: 50 mL / OUT: 0 mL / NET: 50 mL        GENERAL: stable , ambulates on floor, no fevers or SOB.   NECK: supple, No JVD  CHEST/LUNG: clear to auscultation bilaterally; no rales, rhonchi, or wheezing b/l  HEART: normal S1, S2  ABDOMEN: BS+, soft, ND, NT   EXTREMITIES:  pulses palpable; no clubbing, cyanosis, or edema b/l LEs  NEURO: awake, alert, interactive; moves all extremities  SKIN: no rashes or lesions      LABS:                        13.1   16.2  )-----------( 99       ( 20 Oct 2017 06:11 )             40.3     10-20    141  |  102  |  24<H>  ----------------------------<  266<H>  4.9   |  21<L>  |  1.50<H>    Ca    9.4      20 Oct 2017 06:11  Phos  3.4     10-18  Mg     2.0     10-18    TPro  7.5  /  Alb  4.5  /  TBili  1.5<H>  /  DBili  x   /  AST  44<H>  /  ALT  37  /  AlkPhos  64  10-18    PT/INR - ( 20 Oct 2017 06:11 )   PT: 14.8 sec;   INR: 1.36 ratio         PTT - ( 19 Oct 2017 06:27 )  PTT:31.3 sec  Urinalysis Basic - ( 20 Oct 2017 07:28 )    Color: Yellow / Appearance: Clear / S.020 / pH: x  Gluc: x / Ketone: Small  / Bili: Negative / Urobili: Negative   Blood: x / Protein: Trace / Nitrite: Negative   Leuk Esterase: Negative / RBC: x / WBC x   Sq Epi: x / Non Sq Epi: x / Bacteria: x

## 2017-10-20 NOTE — PROGRESS NOTE ADULT - ASSESSMENT
77 year old male Coumadin PMH PPM, AFib, CAD, hyperlipidemia, ischemic cardiomyopathy and pshx of CABG, ICD presents with evaluation of pacemaker. Patient states pacemaker's battery and wire are dead and needs replacement. Went to cardiologist for his pacemaker and had Dr. Santana send him to ER.     CV: Restart coumadin, Digoxin. Decrease Toprol to 100 mg/day. Continue Lasix 20 mg/day and aspirin 81 mg/day. EP follow-up in 2 weeks.      Derm: Called to eval non-healing skin lesion on chest wall r/o squamous cell cancer. Will need skin biopsy next week.     Discharge to home.

## 2017-10-20 NOTE — PROGRESS NOTE ADULT - PROBLEM SELECTOR PLAN 3
-Coumadin dosing per PT/INR   -Monitor wound site closely for bleeding  -Will remove pressure dressing prior to discharge home    DAYANARA [place] [id number]

## 2017-10-20 NOTE — DISCHARGE NOTE ADULT - PATIENT PORTAL LINK FT
“You can access the Follow[facility] Patient Portal, offered by [facility], by registering with the following website: [email or web address]”

## 2017-10-20 NOTE — DISCHARGE NOTE ADULT - CARE PROVIDER_API CALL
Migel Stoddard), Internal Medicine  [address] [place] [id number]  Phone: [phone]  Fax: [phone]

## 2017-10-20 NOTE — PROVIDER CONTACT NOTE (OTHER) - ACTION/TREATMENT ORDERED:
NP evaluated pt and ordered UA. Pt reoriented to surroundings. Pt now returned to baseline mentation. Bed alarm activated. Will continue to monitor and maintain safety.

## 2017-10-20 NOTE — DISCHARGE NOTE ADULT - MEDICATION SUMMARY - MEDICATIONS TO STOP TAKING
I will STOP taking the medications listed below when I get home from the hospital:    lisinopril 20 mg oral tablet  -- 1 tab(s) by mouth every other day    amoxicillin-clavulanate 875 mg-125 mg oral tablet  -- 1 tab(s) by mouth 2 times a day  -- Finish all this medication unless otherwise directed by prescriber.  Take with food or milk.

## 2017-10-20 NOTE — DISCHARGE NOTE ADULT - CARE PLAN
Principal Discharge DX:	AICD lead malfunction  Instructions for follow-up, activity and diet:	Had ICD generator change and RV lead revision .  Secondary Diagnosis:	Ischemic Cardiomyopathy  Instructions for follow-up, activity and diet:	Continue beta block.  Secondary Diagnosis:	Atrial fibrillation  Instructions for follow-up, activity and diet:	dose daily Coumadin  Secondary Diagnosis:	Wound  Instructions for follow-up, activity and diet:	Rodent ulcer at level of right clavicular head,. Must r/o basal Cell Skin Cancer. Biopsy strongly advised to patient.  upon [facility] [address] [place] [address] [phone].  Aquacel to wound. Principal Discharge DX:	AICD lead malfunction  Goal:	had AICD lead revision  Instructions for follow-up, activity and diet:	Had ICD generator change and RV lead revision .  Secondary Diagnosis:	Ischemic Cardiomyopathy  Instructions for follow-up, activity and diet:	Continue beta block.  Secondary Diagnosis:	Atrial fibrillation  Instructions for follow-up, activity and diet:	dose daily Coumadin  Secondary Diagnosis:	Wound  Instructions for follow-up, activity and diet:	Rodent ulcer at level of right clavicular head,. Must r/o basal Cell Skin Cancer. Biopsy strongly advised to patient.  upon discharge f/u as outpatient at [facility] [address] [place] [address] [phone].  Aquacel to wound.

## 2017-10-20 NOTE — PROVIDER CONTACT NOTE (OTHER) - BACKGROUND
Admitted for mechanical breakdown of ppm. PMH of ventricular arrythmmia, Afib, and CAD. S/P pacemaker generator change and lead revision on 10/19/17.

## 2017-10-20 NOTE — DISCHARGE NOTE ADULT - PLAN OF CARE
Had ICD generator change and RV lead revision . Continue beta block. dose daily Coumadin Rodent ulcer at level of right clavicular head,. Must r/o basal Cell Skin Cancer. Biopsy strongly advised to patient.  upon discharge f/u as outpatient at [facility] [address] [facility] [phone].  Aquacel to wound. had AICD lead revision

## 2017-10-20 NOTE — PROGRESS NOTE ADULT - ASSESSMENT
77 year old male Coumadin PMH PPM right sided, AFib (on Coumadin), CABG, CAD, hyperlipidemia, ischemic cardiomyopathy, s/p ICD left sided (Medtronic), sent to ER after seen in EP clinic with ICD at elective replacement indicator (THERON) and RV lead malfunction with impedance >3000 ohms and elevated RV thresholds.

## 2017-11-10 ENCOUNTER — APPOINTMENT (OUTPATIENT)
Dept: ELECTROPHYSIOLOGY | Facility: CLINIC | Age: 77
End: 2017-11-10
Payer: COMMERCIAL

## 2017-11-10 ENCOUNTER — NON-APPOINTMENT (OUTPATIENT)
Age: 77
End: 2017-11-10

## 2017-11-10 VITALS
OXYGEN SATURATION: 99 % | WEIGHT: 174 LBS | BODY MASS INDEX: 22.96 KG/M2 | HEART RATE: 64 BPM | SYSTOLIC BLOOD PRESSURE: 146 MMHG | DIASTOLIC BLOOD PRESSURE: 86 MMHG

## 2017-11-10 PROCEDURE — 99024 POSTOP FOLLOW-UP VISIT: CPT

## 2018-02-13 ENCOUNTER — APPOINTMENT (OUTPATIENT)
Dept: ELECTROPHYSIOLOGY | Facility: CLINIC | Age: 78
End: 2018-02-13
Payer: COMMERCIAL

## 2018-02-13 VITALS
WEIGHT: 175 LBS | DIASTOLIC BLOOD PRESSURE: 82 MMHG | OXYGEN SATURATION: 99 % | BODY MASS INDEX: 23.09 KG/M2 | SYSTOLIC BLOOD PRESSURE: 145 MMHG | HEART RATE: 65 BPM

## 2018-02-13 PROCEDURE — 93282 PRGRMG EVAL IMPLANTABLE DFB: CPT

## 2018-11-19 ENCOUNTER — OUTPATIENT (OUTPATIENT)
Dept: OUTPATIENT SERVICES | Facility: HOSPITAL | Age: 78
LOS: 1 days | Discharge: HOME | End: 2018-11-19
Payer: COMMERCIAL

## 2018-11-23 ENCOUNTER — APPOINTMENT (OUTPATIENT)
Dept: RADIATION ONCOLOGY | Facility: CLINIC | Age: 78
End: 2018-11-23
Payer: MEDICARE

## 2018-11-23 VITALS
HEIGHT: 72 IN | WEIGHT: 161.93 LBS | SYSTOLIC BLOOD PRESSURE: 122 MMHG | HEART RATE: 84 BPM | OXYGEN SATURATION: 99 % | RESPIRATION RATE: 16 BRPM | TEMPERATURE: 97.1 F | DIASTOLIC BLOOD PRESSURE: 71 MMHG | BODY MASS INDEX: 21.93 KG/M2

## 2018-11-23 PROCEDURE — 99204 OFFICE O/P NEW MOD 45 MIN: CPT | Mod: GC,25

## 2018-11-26 NOTE — PHYSICAL EXAM
[Normal] : oriented to person, place and time, the affect was normal, the mood was normal and not anxious [de-identified] : left helix biopsy scar is dry. [de-identified] : multiple hyperpigmented macules, papules, and papules on the back [de-identified] : 1.2 cm circular ulceration that is draining blood with a heaped up rim. diffuse scaly patches and papules on the face and posterior neck.

## 2018-11-26 NOTE — LETTER CLOSING
[Consult Closing:] : Thank you for allowing me to participate in the care of this patient.  If you have any questions, please do not hesitate to contact me.

## 2018-11-26 NOTE — VITALS
[Maximal Pain Intensity: 0/10] : 0/10 [Least Pain Intensity: 0/10] : 0/10 [90: Able to carry normal activity; minor signs or symptoms of disease.] : 90: Able to carry normal activity; minor signs or symptoms of disease.  [Date: ____________] : Patient's last distress assessment performed on [unfilled]. [0 - No Distress] : Distress Level: 0 [FreeTextEntry7] : no social work intervention needed at this time. \par patient lives at home with his wife and is able to drive independently to department for treatment.

## 2018-11-26 NOTE — DISEASE MANAGEMENT
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

## 2018-11-26 NOTE — LETTER GREETING
[Dear  ___] : Dear  [unfilled], [Consult Letter:] : Your patient, [unfilled] was seen in my office today for consultation.

## 2018-11-26 NOTE — HISTORY OF PRESENT ILLNESS
[FreeTextEntry1] : On November 8, 2018 the patient had biopsy of the anterior lower neck and upper sternum revealing invasive moderately to poorly differentiated squamous cell carcinoma.  Microscopic description:  arising from the epidermis extending to the dermis there are aggregates of dysplastic characteristics. Within the dermis there are aggregates of basaloid cells with peripheral palisading of nuclei. The gross specimen was 1.2 x 0.3 cm. \par \par Patient has pacemaker/ICD Medtronic (Evera MRIXT DR Defibrillator).\par \par Today in clinic, the patient's son states that the skin lesion on the upper sternum had been growing for at least a year before having a dermatologic evaluation. The patient did not think anything of the skin lesion. The patient has not had any skin biopsies prior to this one. The patient's son states that the patient has a history of multiple BCCs that were treated with topicals. The patient's son states that the patient had a CT neck and chest during the week of Oct. 15, 2018. The results were not communicated to the patient or his family. The scan has been requested from the PCP office. The patient denies pain and any neurologic deficits. \par

## 2018-12-03 PROCEDURE — 77263 THER RADIOLOGY TX PLNG CPLX: CPT

## 2018-12-05 NOTE — ED ADULT NURSE NOTE - BSA (M2)
Telephone Encounter by Courtney Horton CMA at 04/20/18 04:33 PM     Author:  Courtney Horton CMA Service:  (none) Author Type:  Certified Medical Assistant     Filed:  04/20/18 04:33 PM Encounter Date:  4/20/2018 Status:  Signed     :  Courtney Horton CMA (Certified Medical Assistant)       From: Michaelashli Neff  To: Christos Tracy MD  Sent: 4/20/2018  9:49 AM CDT  Subject: Referral Request    I need a referral to set up a yearly Diabetic Eye Exam.  [facility]       Revision History        Date/Time User Provider Type Action    > 04/20/18 04:33 PM Courtney Horton CMA Certified Medical Assistant Sign    Attribution information within the note text is not available.             2.03

## 2018-12-12 ENCOUNTER — RESULT REVIEW (OUTPATIENT)
Age: 78
End: 2018-12-12

## 2018-12-13 PROCEDURE — 77334 RADIATION TREATMENT AID(S): CPT | Mod: 26

## 2018-12-13 PROCEDURE — 77290 THER RAD SIMULAJ FIELD CPLX: CPT | Mod: 26

## 2019-01-08 PROCEDURE — 77334 RADIATION TREATMENT AID(S): CPT | Mod: 26

## 2019-01-08 PROCEDURE — 77321 SPECIAL TELETX PORT PLAN: CPT | Mod: 26

## 2019-01-17 PROCEDURE — 77280 THER RAD SIMULAJ FIELD SMPL: CPT | Mod: 26

## 2019-01-18 PROCEDURE — 77427 RADIATION TX MANAGEMENT X5: CPT

## 2019-01-22 PROCEDURE — 77331 SPECIAL RADIATION DOSIMETRY: CPT | Mod: 26

## 2019-01-23 VITALS
BODY MASS INDEX: 25.23 KG/M2 | HEART RATE: 80 BPM | WEIGHT: 186 LBS | DIASTOLIC BLOOD PRESSURE: 74 MMHG | SYSTOLIC BLOOD PRESSURE: 122 MMHG | OXYGEN SATURATION: 99 % | RESPIRATION RATE: 16 BRPM

## 2019-01-25 PROCEDURE — 77427 RADIATION TX MANAGEMENT X5: CPT

## 2019-01-27 NOTE — HISTORY OF PRESENT ILLNESS
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 \par \par Tolerating treatment. No new complaints\par Area remains ulcerated with surrounding edrythema.

## 2019-01-28 PROCEDURE — 77427 RADIATION TX MANAGEMENT X5: CPT

## 2019-01-29 VITALS
HEIGHT: 72 IN | HEART RATE: 73 BPM | TEMPERATURE: 97.8 F | RESPIRATION RATE: 16 BRPM | WEIGHT: 165.45 LBS | BODY MASS INDEX: 22.41 KG/M2 | SYSTOLIC BLOOD PRESSURE: 118 MMHG | OXYGEN SATURATION: 97 % | DIASTOLIC BLOOD PRESSURE: 72 MMHG

## 2019-02-01 PROCEDURE — 77427 RADIATION TX MANAGEMENT X5: CPT

## 2019-02-04 PROCEDURE — 77427 RADIATION TX MANAGEMENT X5: CPT

## 2019-02-05 NOTE — REVIEW OF SYSTEMS
[Fatigue: Grade 0] : Fatigue: Grade 0 [Skin Hyperpigmentation: Grade 0] : Skin Hyperpigmentation: Grade 0 [Dermatitis Radiation: Grade 2 - Moderate to brisk erythema; patchy moist desquamation, mostly confined to skin folds and creases; moderate edema] : Dermatitis Radiation: Grade 2 - Moderate to brisk erythema; patchy moist desquamation, mostly confined to skin folds and creases; moderate edema

## 2019-02-05 NOTE — HISTORY OF PRESENT ILLNESS
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 \par \par Tolerating treatment. No new complaints\par Area remains ulcerated with surrounding erythema.

## 2019-02-05 NOTE — DISEASE MANAGEMENT
[Pathological] : TNM Stage: p [N/A] : Currently not applicable [TTNM] : x [NTNM] : x [MTNM] : x [de-identified] : 1928 [de-identified] : 2483 [de-identified] : right chest

## 2019-02-06 NOTE — DISEASE MANAGEMENT
[Pathological] : TNM Stage: p [N/A] : Currently not applicable [TTNM] : x [NTNM] : x [MTNM] : x [de-identified] : 6432 [de-identified] : 4873 [de-identified] : right chest

## 2019-02-06 NOTE — HISTORY OF PRESENT ILLNESS
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 \par \par Tolerating treatment. No new complaints.\par Area remains ulcerated with surrounding moderate erythema. Dressing changed and to be changed daily

## 2019-02-08 PROCEDURE — 77427 RADIATION TX MANAGEMENT X5: CPT

## 2019-02-11 PROCEDURE — 77427 RADIATION TX MANAGEMENT X5: CPT

## 2019-02-19 PROCEDURE — 77427 RADIATION TX MANAGEMENT X5: CPT

## 2019-02-21 NOTE — DISEASE MANAGEMENT
[Pathological] : TNM Stage: p [N/A] : Currently not applicable [TTNM] : x [NTNM] : x [MTNM] : x [de-identified] : 5229 [de-identified] : 4700 [de-identified] : right chest

## 2019-02-22 NOTE — HISTORY OF PRESENT ILLNESS
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 \par \par Tolerating treatment. No fatigue or pain. Area remains ulcerated with surrounding impending moist desquamation and moderate erythema. Area cleaned with NS. Applied Silvadene and Telfa. Supplies given. Will return in 1 week for skin check. Will be going to EP for check up

## 2019-02-22 NOTE — DISEASE MANAGEMENT
[Pathological] : TNM Stage: p [N/A] : Currently not applicable [TTNM] : x [NTNM] : x [MTNM] : x [de-identified] : 8602 [de-identified] : 9575 [de-identified] : right chest

## 2019-02-26 VITALS
HEART RATE: 79 BPM | SYSTOLIC BLOOD PRESSURE: 125 MMHG | DIASTOLIC BLOOD PRESSURE: 70 MMHG | BODY MASS INDEX: 21.78 KG/M2 | WEIGHT: 160.6 LBS | OXYGEN SATURATION: 98 % | RESPIRATION RATE: 16 BRPM

## 2019-04-09 ENCOUNTER — APPOINTMENT (OUTPATIENT)
Dept: RADIATION ONCOLOGY | Facility: CLINIC | Age: 79
End: 2019-04-09
Payer: MEDICARE

## 2019-04-09 VITALS
HEART RATE: 70 BPM | RESPIRATION RATE: 15 BRPM | SYSTOLIC BLOOD PRESSURE: 119 MMHG | BODY MASS INDEX: 21.84 KG/M2 | OXYGEN SATURATION: 98 % | WEIGHT: 161 LBS | TEMPERATURE: 97.52 F | DIASTOLIC BLOOD PRESSURE: 67 MMHG

## 2019-04-09 PROCEDURE — 99024 POSTOP FOLLOW-UP VISIT: CPT

## 2019-04-09 NOTE — DISEASE MANAGEMENT
[Clinical] : TNM Stage: c [N/A] : Currently not applicable [TTNM] : x [FreeTextEntry4] : cutaneous squamous cell of the thorax [MTNM] : x [NTNM] : x

## 2019-04-09 NOTE — REVIEW OF SYSTEMS
[Dermatitis Radiation: Grade 1 - Faint erythema or dry desquamation] : Dermatitis Radiation: Grade 1 - Faint erythema or dry desquamation

## 2019-04-09 NOTE — HISTORY OF PRESENT ILLNESS
[FreeTextEntry1] : Jitendra Contreras is a 78 year old male with at least a cT1c invasive squmaous cell carcinoma of the mid upper sternal area. He received radiation to the right chest to a total dose of 5500 cGy in 20 fractions. Treatment was delivered from 1/18/19 - 2/19/19. He tolerated his radiation well. He did not develop any grade 3 or higher acute toxicities as a result of the treatment. At the conclusion of his treatment, he had at least a grade 2 dermatitis with near complete resolution of the tumor. \par \par He returned for skin checks with continued improvement of the moist desquamation developed after treatment completed. Today he returns for a post treatment evaluation. He is doing well. Denies any pain.

## 2019-06-17 ENCOUNTER — APPOINTMENT (OUTPATIENT)
Dept: INTERNAL MEDICINE | Facility: CLINIC | Age: 79
End: 2019-06-17
Payer: MEDICARE

## 2019-06-17 VITALS
HEART RATE: 85 BPM | BODY MASS INDEX: 20.34 KG/M2 | WEIGHT: 150 LBS | RESPIRATION RATE: 14 BRPM | OXYGEN SATURATION: 97 %

## 2019-06-17 VITALS — SYSTOLIC BLOOD PRESSURE: 122 MMHG | DIASTOLIC BLOOD PRESSURE: 72 MMHG

## 2019-06-17 PROCEDURE — 36415 COLL VENOUS BLD VENIPUNCTURE: CPT

## 2019-06-17 PROCEDURE — 99213 OFFICE O/P EST LOW 20 MIN: CPT | Mod: 25

## 2019-06-17 NOTE — REVIEW OF SYSTEMS
[Fever] : no fever [Chills] : no chills [Chest Pain] : no chest pain [Palpitations] : no palpitations [Shortness Of Breath] : no shortness of breath [Wheezing] : no wheezing [Lower Ext Edema] : no lower extremity edema [Nausea] : no nausea [Joint Pain] : no joint pain [Abdominal Pain] : no abdominal pain [Joint Stiffness] : no joint stiffness [de-identified] : psoriasis

## 2019-06-17 NOTE — HISTORY OF PRESENT ILLNESS
[FreeTextEntry8] : Pt comes for coumadincheck and exam  No brui\par sing or bleeding noted  Pt intermittently takes coumadin as he has many bleeding skin lesions at times.   Aware of risk of stroke when stopping coumadin on his own

## 2019-06-17 NOTE — PHYSICAL EXAM
[No Acute Distress] : no acute distress [Well Nourished] : well nourished [No JVD] : no jugular venous distention [No Respiratory Distress] : no respiratory distress  [Clear to Auscultation] : lungs were clear to auscultation bilaterally [Soft] : abdomen soft [No Edema] : there was no peripheral edema [Non Tender] : non-tender [Non-distended] : non-distended [de-identified] :  vr 62 [No Masses] : no abdominal mass palpated [de-identified] : dry flaky psoriasis

## 2019-06-18 DIAGNOSIS — Z87.898 PERSONAL HISTORY OF OTHER SPECIFIED CONDITIONS: ICD-10-CM

## 2019-06-18 DIAGNOSIS — Z87.438 PERSONAL HISTORY OF OTHER DISEASES OF MALE GENITAL ORGANS: ICD-10-CM

## 2019-06-18 DIAGNOSIS — Z86.79 PERSONAL HISTORY OF OTHER DISEASES OF THE CIRCULATORY SYSTEM: ICD-10-CM

## 2019-06-18 DIAGNOSIS — Z82.3 FAMILY HISTORY OF STROKE: ICD-10-CM

## 2019-06-18 LAB
INR PPP: 1.08 RATIO
PT BLD: 12.2 SEC

## 2019-06-18 RX ORDER — ZOLPIDEM TARTRATE 10 MG/1
10 TABLET, FILM COATED ORAL
Refills: 0 | Status: ACTIVE | COMMUNITY

## 2019-08-05 ENCOUNTER — TRANSCRIPTION ENCOUNTER (OUTPATIENT)
Age: 79
End: 2019-08-05

## 2019-08-05 ENCOUNTER — INPATIENT (INPATIENT)
Facility: HOSPITAL | Age: 79
LOS: 2 days | Discharge: HOME | DRG: 638 | End: 2019-08-08
Attending: FAMILY MEDICINE | Admitting: INTERNAL MEDICINE
Payer: MEDICARE

## 2019-08-05 VITALS
TEMPERATURE: 98 F | HEIGHT: 69 IN | RESPIRATION RATE: 19 BRPM | DIASTOLIC BLOOD PRESSURE: 73 MMHG | SYSTOLIC BLOOD PRESSURE: 122 MMHG | HEART RATE: 74 BPM | OXYGEN SATURATION: 99 % | WEIGHT: 160.06 LBS

## 2019-08-05 DIAGNOSIS — S62.609B FRACTURE OF UNSPECIFIED PHALANX OF UNSPECIFIED FINGER, INITIAL ENCOUNTER FOR OPEN FRACTURE: ICD-10-CM

## 2019-08-05 DIAGNOSIS — I48.91 UNSPECIFIED ATRIAL FIBRILLATION: ICD-10-CM

## 2019-08-05 DIAGNOSIS — E78.5 HYPERLIPIDEMIA, UNSPECIFIED: ICD-10-CM

## 2019-08-05 DIAGNOSIS — Z29.9 ENCOUNTER FOR PROPHYLACTIC MEASURES, UNSPECIFIED: ICD-10-CM

## 2019-08-05 DIAGNOSIS — I25.5 ISCHEMIC CARDIOMYOPATHY: ICD-10-CM

## 2019-08-05 DIAGNOSIS — E11.9 TYPE 2 DIABETES MELLITUS WITHOUT COMPLICATIONS: ICD-10-CM

## 2019-08-05 DIAGNOSIS — T14.8XXA OTHER INJURY OF UNSPECIFIED BODY REGION, INITIAL ENCOUNTER: ICD-10-CM

## 2019-08-05 DIAGNOSIS — N17.9 ACUTE KIDNEY FAILURE, UNSPECIFIED: ICD-10-CM

## 2019-08-05 LAB
ALBUMIN SERPL ELPH-MCNC: 3.6 G/DL — SIGNIFICANT CHANGE UP (ref 3.3–5)
ALP SERPL-CCNC: 123 U/L — HIGH (ref 40–120)
ALT FLD-CCNC: 38 U/L — SIGNIFICANT CHANGE UP (ref 12–78)
ANION GAP SERPL CALC-SCNC: 8 MMOL/L — SIGNIFICANT CHANGE UP (ref 5–17)
APTT BLD: 58.3 SEC — HIGH (ref 28.5–37)
AST SERPL-CCNC: 35 U/L — SIGNIFICANT CHANGE UP (ref 15–37)
BASOPHILS # BLD AUTO: 0.06 K/UL — SIGNIFICANT CHANGE UP (ref 0–0.2)
BASOPHILS NFR BLD AUTO: 1 % — SIGNIFICANT CHANGE UP (ref 0–2)
BILIRUB SERPL-MCNC: 1 MG/DL — SIGNIFICANT CHANGE UP (ref 0.2–1.2)
BUN SERPL-MCNC: 29 MG/DL — HIGH (ref 7–23)
CALCIUM SERPL-MCNC: 9.7 MG/DL — SIGNIFICANT CHANGE UP (ref 8.5–10.1)
CHLORIDE SERPL-SCNC: 104 MMOL/L — SIGNIFICANT CHANGE UP (ref 96–108)
CO2 SERPL-SCNC: 28 MMOL/L — SIGNIFICANT CHANGE UP (ref 22–31)
CREAT SERPL-MCNC: 1.6 MG/DL — HIGH (ref 0.5–1.3)
EOSINOPHIL # BLD AUTO: 0.3 K/UL — SIGNIFICANT CHANGE UP (ref 0–0.5)
EOSINOPHIL NFR BLD AUTO: 5.1 % — SIGNIFICANT CHANGE UP (ref 0–6)
GLUCOSE SERPL-MCNC: 177 MG/DL — HIGH (ref 70–99)
HCT VFR BLD CALC: 45.4 % — SIGNIFICANT CHANGE UP (ref 39–50)
HGB BLD-MCNC: 14.7 G/DL — SIGNIFICANT CHANGE UP (ref 13–17)
IMM GRANULOCYTES NFR BLD AUTO: 0.2 % — SIGNIFICANT CHANGE UP (ref 0–1.5)
INR BLD: 5.2 RATIO — CRITICAL HIGH (ref 0.88–1.16)
LACTATE SERPL-SCNC: 1.6 MMOL/L — SIGNIFICANT CHANGE UP (ref 0.7–2)
LYMPHOCYTES # BLD AUTO: 1.26 K/UL — SIGNIFICANT CHANGE UP (ref 1–3.3)
LYMPHOCYTES # BLD AUTO: 21.3 % — SIGNIFICANT CHANGE UP (ref 13–44)
MCHC RBC-ENTMCNC: 30.9 PG — SIGNIFICANT CHANGE UP (ref 27–34)
MCHC RBC-ENTMCNC: 32.4 GM/DL — SIGNIFICANT CHANGE UP (ref 32–36)
MCV RBC AUTO: 95.4 FL — SIGNIFICANT CHANGE UP (ref 80–100)
MONOCYTES # BLD AUTO: 0.53 K/UL — SIGNIFICANT CHANGE UP (ref 0–0.9)
MONOCYTES NFR BLD AUTO: 9 % — SIGNIFICANT CHANGE UP (ref 2–14)
NEUTROPHILS # BLD AUTO: 3.75 K/UL — SIGNIFICANT CHANGE UP (ref 1.8–7.4)
NEUTROPHILS NFR BLD AUTO: 63.4 % — SIGNIFICANT CHANGE UP (ref 43–77)
NRBC # BLD: 0 /100 WBCS — SIGNIFICANT CHANGE UP (ref 0–0)
PLATELET # BLD AUTO: 160 K/UL — SIGNIFICANT CHANGE UP (ref 150–400)
POTASSIUM SERPL-MCNC: 4.3 MMOL/L — SIGNIFICANT CHANGE UP (ref 3.5–5.3)
POTASSIUM SERPL-SCNC: 4.3 MMOL/L — SIGNIFICANT CHANGE UP (ref 3.5–5.3)
PROT SERPL-MCNC: 8.3 G/DL — SIGNIFICANT CHANGE UP (ref 6–8.3)
PROTHROM AB SERPL-ACNC: 62.4 SEC — HIGH (ref 10–12.9)
RBC # BLD: 4.76 M/UL — SIGNIFICANT CHANGE UP (ref 4.2–5.8)
RBC # FLD: 13.3 % — SIGNIFICANT CHANGE UP (ref 10.3–14.5)
SODIUM SERPL-SCNC: 140 MMOL/L — SIGNIFICANT CHANGE UP (ref 135–145)
WBC # BLD: 5.91 K/UL — SIGNIFICANT CHANGE UP (ref 3.8–10.5)
WBC # FLD AUTO: 5.91 K/UL — SIGNIFICANT CHANGE UP (ref 3.8–10.5)

## 2019-08-05 PROCEDURE — 73140 X-RAY EXAM OF FINGER(S): CPT | Mod: 26,RT

## 2019-08-05 PROCEDURE — 99223 1ST HOSP IP/OBS HIGH 75: CPT | Mod: GC

## 2019-08-05 PROCEDURE — 99284 EMERGENCY DEPT VISIT MOD MDM: CPT

## 2019-08-05 PROCEDURE — 93010 ELECTROCARDIOGRAM REPORT: CPT

## 2019-08-05 RX ORDER — SODIUM CHLORIDE 9 MG/ML
1000 INJECTION INTRAMUSCULAR; INTRAVENOUS; SUBCUTANEOUS
Refills: 0 | Status: DISCONTINUED | OUTPATIENT
Start: 2019-08-05 | End: 2019-08-05

## 2019-08-05 RX ORDER — VANCOMYCIN HCL 1 G
1000 VIAL (EA) INTRAVENOUS ONCE
Refills: 0 | Status: COMPLETED | OUTPATIENT
Start: 2019-08-05 | End: 2019-08-05

## 2019-08-05 RX ORDER — SODIUM CHLORIDE 9 MG/ML
1000 INJECTION, SOLUTION INTRAVENOUS
Refills: 0 | Status: DISCONTINUED | OUTPATIENT
Start: 2019-08-05 | End: 2019-08-08

## 2019-08-05 RX ORDER — BACITRACIN ZINC 500 UNIT/G
1 OINTMENT IN PACKET (EA) TOPICAL ONCE
Refills: 0 | Status: COMPLETED | OUTPATIENT
Start: 2019-08-05 | End: 2019-08-05

## 2019-08-05 RX ORDER — DEXTROSE 50 % IN WATER 50 %
12.5 SYRINGE (ML) INTRAVENOUS ONCE
Refills: 0 | Status: DISCONTINUED | OUTPATIENT
Start: 2019-08-05 | End: 2019-08-08

## 2019-08-05 RX ORDER — PIPERACILLIN AND TAZOBACTAM 4; .5 G/20ML; G/20ML
3.38 INJECTION, POWDER, LYOPHILIZED, FOR SOLUTION INTRAVENOUS ONCE
Refills: 0 | Status: COMPLETED | OUTPATIENT
Start: 2019-08-05 | End: 2019-08-05

## 2019-08-05 RX ORDER — SODIUM CHLORIDE 9 MG/ML
1000 INJECTION INTRAMUSCULAR; INTRAVENOUS; SUBCUTANEOUS
Refills: 0 | Status: DISCONTINUED | OUTPATIENT
Start: 2019-08-05 | End: 2019-08-08

## 2019-08-05 RX ORDER — VANCOMYCIN HCL 1 G
1000 VIAL (EA) INTRAVENOUS EVERY 12 HOURS
Refills: 0 | Status: DISCONTINUED | OUTPATIENT
Start: 2019-08-05 | End: 2019-08-05

## 2019-08-05 RX ORDER — GLUCAGON INJECTION, SOLUTION 0.5 MG/.1ML
1 INJECTION, SOLUTION SUBCUTANEOUS ONCE
Refills: 0 | Status: DISCONTINUED | OUTPATIENT
Start: 2019-08-05 | End: 2019-08-08

## 2019-08-05 RX ORDER — CEFAZOLIN SODIUM 1 G
1000 VIAL (EA) INJECTION ONCE
Refills: 0 | Status: COMPLETED | OUTPATIENT
Start: 2019-08-05 | End: 2019-08-05

## 2019-08-05 RX ORDER — ACETAMINOPHEN 500 MG
650 TABLET ORAL EVERY 6 HOURS
Refills: 0 | Status: DISCONTINUED | OUTPATIENT
Start: 2019-08-05 | End: 2019-08-08

## 2019-08-05 RX ORDER — DEXTROSE 50 % IN WATER 50 %
25 SYRINGE (ML) INTRAVENOUS ONCE
Refills: 0 | Status: DISCONTINUED | OUTPATIENT
Start: 2019-08-05 | End: 2019-08-08

## 2019-08-05 RX ORDER — SODIUM CHLORIDE 9 MG/ML
1000 INJECTION INTRAMUSCULAR; INTRAVENOUS; SUBCUTANEOUS ONCE
Refills: 0 | Status: COMPLETED | OUTPATIENT
Start: 2019-08-05 | End: 2019-08-05

## 2019-08-05 RX ORDER — AMLODIPINE BESYLATE 2.5 MG/1
5 TABLET ORAL DAILY
Refills: 0 | Status: DISCONTINUED | OUTPATIENT
Start: 2019-08-05 | End: 2019-08-08

## 2019-08-05 RX ORDER — INSULIN LISPRO 100/ML
VIAL (ML) SUBCUTANEOUS
Refills: 0 | Status: DISCONTINUED | OUTPATIENT
Start: 2019-08-05 | End: 2019-08-08

## 2019-08-05 RX ORDER — LISINOPRIL 2.5 MG/1
40 TABLET ORAL DAILY
Refills: 0 | Status: DISCONTINUED | OUTPATIENT
Start: 2019-08-05 | End: 2019-08-05

## 2019-08-05 RX ORDER — DEXTROSE 50 % IN WATER 50 %
15 SYRINGE (ML) INTRAVENOUS ONCE
Refills: 0 | Status: DISCONTINUED | OUTPATIENT
Start: 2019-08-05 | End: 2019-08-08

## 2019-08-05 RX ORDER — ATORVASTATIN CALCIUM 80 MG/1
10 TABLET, FILM COATED ORAL AT BEDTIME
Refills: 0 | Status: DISCONTINUED | OUTPATIENT
Start: 2019-08-05 | End: 2019-08-05

## 2019-08-05 RX ORDER — PANTOPRAZOLE SODIUM 20 MG/1
40 TABLET, DELAYED RELEASE ORAL
Refills: 0 | Status: DISCONTINUED | OUTPATIENT
Start: 2019-08-05 | End: 2019-08-08

## 2019-08-05 RX ORDER — SACCHAROMYCES BOULARDII 250 MG
250 POWDER IN PACKET (EA) ORAL
Refills: 0 | Status: DISCONTINUED | OUTPATIENT
Start: 2019-08-05 | End: 2019-08-08

## 2019-08-05 RX ORDER — VANCOMYCIN HCL 1 G
1000 VIAL (EA) INTRAVENOUS EVERY 24 HOURS
Refills: 0 | Status: DISCONTINUED | OUTPATIENT
Start: 2019-08-06 | End: 2019-08-08

## 2019-08-05 RX ORDER — GLIMEPIRIDE 1 MG
1 TABLET ORAL
Qty: 0 | Refills: 0 | DISCHARGE

## 2019-08-05 RX ORDER — FUROSEMIDE 40 MG
20 TABLET ORAL DAILY
Refills: 0 | Status: DISCONTINUED | OUTPATIENT
Start: 2019-08-05 | End: 2019-08-05

## 2019-08-05 RX ORDER — PHYTONADIONE (VIT K1) 5 MG
5 TABLET ORAL ONCE
Refills: 0 | Status: COMPLETED | OUTPATIENT
Start: 2019-08-05 | End: 2019-08-05

## 2019-08-05 RX ORDER — PIPERACILLIN AND TAZOBACTAM 4; .5 G/20ML; G/20ML
3.38 INJECTION, POWDER, LYOPHILIZED, FOR SOLUTION INTRAVENOUS EVERY 8 HOURS
Refills: 0 | Status: DISCONTINUED | OUTPATIENT
Start: 2019-08-05 | End: 2019-08-06

## 2019-08-05 RX ORDER — DIGOXIN 250 MCG
0.12 TABLET ORAL EVERY OTHER DAY
Refills: 0 | Status: DISCONTINUED | OUTPATIENT
Start: 2019-08-05 | End: 2019-08-08

## 2019-08-05 RX ORDER — TETANUS TOXOID, REDUCED DIPHTHERIA TOXOID AND ACELLULAR PERTUSSIS VACCINE, ADSORBED 5; 2.5; 8; 8; 2.5 [IU]/.5ML; [IU]/.5ML; UG/.5ML; UG/.5ML; UG/.5ML
0.5 SUSPENSION INTRAMUSCULAR ONCE
Refills: 0 | Status: COMPLETED | OUTPATIENT
Start: 2019-08-05 | End: 2019-08-05

## 2019-08-05 RX ORDER — INSULIN LISPRO 100/ML
VIAL (ML) SUBCUTANEOUS AT BEDTIME
Refills: 0 | Status: DISCONTINUED | OUTPATIENT
Start: 2019-08-05 | End: 2019-08-08

## 2019-08-05 RX ORDER — LISINOPRIL 2.5 MG/1
20 TABLET ORAL DAILY
Refills: 0 | Status: DISCONTINUED | OUTPATIENT
Start: 2019-08-05 | End: 2019-08-05

## 2019-08-05 RX ADMIN — Medication 1 APPLICATION(S): at 20:30

## 2019-08-05 RX ADMIN — PIPERACILLIN AND TAZOBACTAM 200 GRAM(S): 4; .5 INJECTION, POWDER, LYOPHILIZED, FOR SOLUTION INTRAVENOUS at 19:35

## 2019-08-05 RX ADMIN — Medication 100 MILLIGRAM(S): at 18:26

## 2019-08-05 RX ADMIN — SODIUM CHLORIDE 1000 MILLILITER(S): 9 INJECTION INTRAMUSCULAR; INTRAVENOUS; SUBCUTANEOUS at 19:15

## 2019-08-05 RX ADMIN — SODIUM CHLORIDE 1000 MILLILITER(S): 9 INJECTION INTRAMUSCULAR; INTRAVENOUS; SUBCUTANEOUS at 18:15

## 2019-08-05 RX ADMIN — Medication 250 MILLIGRAM(S): at 20:30

## 2019-08-05 RX ADMIN — TETANUS TOXOID, REDUCED DIPHTHERIA TOXOID AND ACELLULAR PERTUSSIS VACCINE, ADSORBED 0.5 MILLILITER(S): 5; 2.5; 8; 8; 2.5 SUSPENSION INTRAMUSCULAR at 18:33

## 2019-08-05 RX ADMIN — SODIUM CHLORIDE 50 MILLILITER(S): 9 INJECTION INTRAMUSCULAR; INTRAVENOUS; SUBCUTANEOUS at 22:35

## 2019-08-05 NOTE — ED ADULT NURSE NOTE - OBJECTIVE STATEMENT
Pt states he slammed 2nd finger right hand in car door 1 week ago-finger swollen red with open wound Pt states he slammed 2nd finger in garage door 1 week ago-finger swollen red with open wound Pt states he slammed 2nd finger in garage door 1 week ago-finger swollen red with open wound-pt has several lesions to skin - states he has hx of skin ca- lesion on right side of neck bleeding slightly - dressing applied-

## 2019-08-05 NOTE — H&P ADULT - PROBLEM SELECTOR PLAN 2
Chronic, stable  Continue ASA 81mg  Lisinopril 40mg with hold parameters Chronic, stable  Continue ASA 81mg  hold lisinopril sec to omi   will give norvasc 5 mg for bp control for now

## 2019-08-05 NOTE — H&P ADULT - PROBLEM SELECTOR PLAN 1
Admit to [facility]   Hand surgery Dr. Toth consulted by ED  Recc Rigo/ Vini for suspected osteo  ID consulted Dr. Echevarria  f/u blood cultures  Patient has PPM/ ICD, check if compatible with MRI if necessary Admit to [facility]   Hand surgery Dr. Toth consulted by ED  Recc Rigo/ Vini for suspected osteo  ID consulted Dr. Echevarria  f/u blood cultures, f/u CRP, ESR, procal  Patient has PPM/ ICD, check if compatible with MRI if necessary

## 2019-08-05 NOTE — H&P ADULT - ASSESSMENT
78 year old male PMH AFib (on coumadin), DM II (on insulin) CAD, hyperlipidemia, ischemic cardiomyopathy and pshx of CABG, PPM/ICD presents with right index finger pain and swelling. 78 year old male PMH AFib (on coumadin), DM II (on insulin) CAD, hyperlipidemia, ischemic cardiomyopathy and pshx of CABG, PPM/ICD presents with right index finger pain and swelling. w/u show fracture ??cellulitis hand surgeon  advised treatment for ??osteomyelitis

## 2019-08-05 NOTE — CONSULT NOTE ADULT - SUBJECTIVE AND OBJECTIVE BOX
See dictated note.  Imp: right index finger open fx, most likely osteo, already draining, culture sent.  No definite indication for acute surgical intervention.  Rec: Admit/iv abx/ID consult/Consider MRI/local wound care.  F/u as out-pt w me hen cleared for discharge.  Prognosis for salvaging the finger: Guarded.

## 2019-08-05 NOTE — H&P ADULT - PROBLEM SELECTOR PLAN 4
Chronic, has hx of skin CA s/p resection  Local wound care for right neck skin tear  Bacitracin ordered  Pt has appointment for dermatologist outpatient for f/u

## 2019-08-05 NOTE — H&P ADULT - NSICDXPASTMEDICALHX_GEN_ALL_CORE_FT
PAST MEDICAL HISTORY:  Atrial Fibrillation     CABG (Coronary Artery Bypass Graft)     CAD (Coronary Artery Disease)     Chronic GERD     H/O squamous cell carcinoma excision     Hyperlipidemia     ICD (Implantable Cardiac Defibrillator) in Place     Ischemic Cardiomyopathy     Type 2 diabetes mellitus     Ventricular Arrhythmia

## 2019-08-05 NOTE — ED PROVIDER NOTE - CLINICAL SUMMARY MEDICAL DECISION MAKING FREE TEXT BOX
pt with right index finger swelling, pain erythema, lac s/p inj 8/1 - labs/xr/abx/tdap pt with right index finger swelling, pain erythema, lac s/p inj 8/1 - labs/xr/abx/tdap/hand

## 2019-08-05 NOTE — H&P ADULT - PROBLEM SELECTOR PLAN 7
Baseline  Avoid nephrotoxic meds  gentle hydration with 50cc for 12 hours. Reassess need for fluids in AM with primary team  repeat BMP in AM  Consider renal consult if renal function worsens Baseline  Avoid nephrotoxic meds  gentle hydration with 50cc for 8 hours. Reassess need for fluids in AM with primary team  repeat BMP in AM  Consider renal consult if renal function worsens

## 2019-08-05 NOTE — ED ADULT NURSE NOTE - MUSCULOSKELETAL ASSESSMENT
Continue: PreserVision AREDS 2 (vit c,d-qt-ttzdp-lutein-zeaxan): capsule: [id number] mg-unit-mg-mg - - -

## 2019-08-05 NOTE — ED PROVIDER NOTE - OBJECTIVE STATEMENT
pt c/o pain, swelling and erythema to right index finger s/p injured when caught in between panels of garage door on 8/1. no fevers, chills.  pmd - karen

## 2019-08-05 NOTE — H&P ADULT - PROBLEM SELECTOR PLAN 8
IMPROVE VTE Individual Risk Assessment          RISK                                                          Points  [  ] Previous VTE                                                3  [  ] Thrombophilia                                             2  [  ] Lower limb paralysis                                   2        (unable to hold up >15 seconds)    [  ] Current Cancer                                             2         (within 6 months)  [  ] Immobilization > 24 hrs                              1  [  ] ICU/CCU stay > 24 hours                             1  [  ] Age > 60                                                         1    IMPROVE VTE Score: 1  SCD's, encourage ambulation IMPROVE VTE Individual Risk Assessment          RISK                                                          Points  [  ] Previous VTE                                                3  [  ] Thrombophilia                                             2  [  ] Lower limb paralysis                                   2        (unable to hold up >15 seconds)    [  ] Current Cancer                                             2         (within 6 months)  [  ] Immobilization > 24 hrs                              1  [  ] ICU/CCU stay > 24 hours                             1  [  ] Age > 60                                                         1    IMPROVE VTE Score: 1  SCD's, encourage ambulation  on coumadin

## 2019-08-05 NOTE — H&P ADULT - NSHPPHYSICALEXAM_GEN_ALL_CORE
Physical Exam:  General: NAD, older than stated age, disheveled   HEENT: NCAT, PERRLA, EOMI bl, moist mucous membranes, right eye floater near pupil    Neck: Supple, nontender, no mass, skin tear on right neck with thin, friable skin   Neurology: A&Ox3, nonfocal, sensation intact   Respiratory: CTA B/L, No W/R/R  CV: RRR, irregular   Abdominal: Soft, NT, ND +BSx4, no palpable masses  Extremities: No C/C, + peripheral pulses, chronic statsis change with non-pitting LE edema  MSK: right index finger swollen with erythema, healing laceration over distal phalanx  Heme: No palpable supraclavicular nodules, no obvious ecchymosis or petechiae   Skin: thin, friable

## 2019-08-05 NOTE — H&P ADULT - PROBLEM SELECTOR PLAN 6
Diabetes type II (not on home insulin)  Hold oral hypoglycemic meds  Insulin Corrective Scale  Finger sticks per routine  Consistent Carb Diet  Hemoglobin A1c in AM  Hypoglycemia protocol

## 2019-08-05 NOTE — H&P ADULT - NSHPREVIEWOFSYSTEMS_GEN_ALL_CORE
Constitutional: denies fever, chills  HEENT: denies blurry vision, difficulty hearing  Respiratory: denies SOB, SMITH, cough, sputum production, wheezing  Cardiovascular: denies CP, palpitations, LE edema  Gastrointestinal: denies nausea, vomiting, diarrhea, constipation, abdominal pain  Genitourinary: denies dysuria, frequency, urgency, hematuria   Skin: Denies rashes, itching  Musculoskeletal: admits R index pain swelling and reduced ROM  Neurologic: denies headache, weakness, dizziness, numbness/tingling  Hematology/Oncology: denies bleeding, easy bruising  ROS negative except as noted above

## 2019-08-05 NOTE — H&P ADULT - HISTORY OF PRESENT ILLNESS
78 year old male Coumadin PMH PPM, AFib, CAD, hyperlipidemia, ischemic cardiomyopathy and pshx of CABG, ICD presents with right index finger pain and swelling. History obtained from patient and son at bedside. Patient was opening garage door on Thursday when his right index finger was caught between panels of the door. Initially applied Neosporin with bandage with persitent swelling. Went to  today, advised to present to the ED for evaluation. Denies fevers, chills, headaches, dizziness, numbness, tingling, loss of sensation. UTD with tetanus vaccine.     In the ED: VSS, CBC wnl, INR 5.20, PTT 62.4, BUN 29, Cr 1.60, glucose 177, Alk  phos 123, GFR 41, Xray hand: 1. Significant comminuted/displaced fracture involving 2nd middle phalanx, 2. Also fracture involvement of 2nd distal phalanx.3. Pathologic component of this fracture must be considered. Given Ancef x1, 1 L ns bolus, tdap. 78 year old male PMH AFib (on coumadin), DM II (on insulin) CAD, GERD, hyperlipidemia, ischemic cardiomyopathy, skin ca (?squamous cell s/p resection) and pshx of CABG, PPM/ICD presents with right index finger pain and swelling. History obtained from patient and son at bedside. Patient was opening garage door on Thursday when his right index finger was caught between panels of the door. Initially applied Neosporin with bandage with persitent swelling. Went to  today, advised to present to the ED for evaluation. Denies fevers, chills, headaches, dizziness, numbness, tingling, loss of sensation. UTD with tetanus vaccine.     In the ED: VSS, CBC wnl, INR 5.20, PTT 62.4, BUN 29, Cr 1.60, glucose 177, Alk  phos 123, GFR 41, Xray hand: 1. Significant comminuted/displaced fracture involving 2nd middle phalanx, 2. Also fracture involvement of 2nd distal phalanx.3. Pathologic component of this fracture must be considered. Given Ancef x1, 1 L ns bolus, tdap. EKG V paced rate 64

## 2019-08-05 NOTE — H&P ADULT - NSICDXPASTSURGICALHX_GEN_ALL_CORE_FT
PAST SURGICAL HISTORY:  S/P CABG x 4 2/13/00    S/P ICD (internal cardiac defibrillator) procedure     S/P placement of cardiac pacemaker

## 2019-08-05 NOTE — H&P ADULT - PROBLEM SELECTOR PLAN 3
Chronic, stable  Continue Digoxin .125 mg every other day  On Coumadin, supratheraputic, hold for now. Repeat INR in AM  Patient's med rec is not complete, current med rec based on pharmacy records. Son has list from 2011. Pt notes he may have other meds from mail order but unclear what else he is taking. Son will try to obtain updated list

## 2019-08-05 NOTE — H&P ADULT - ATTENDING COMMENTS
78 year old male with h/o atrial fibb , cardiomyopathy , type 2 dm admitted for fracture RT hand index finger , possible cellulitis r/o osteomyelitis    will continue with  iv vancomycin and zosyn  as per hand surgeon   ID consult   QUANG gentle iv hydration and follow up renal function   hold nephrotoxic agents   hold coumadin for supra therapeutic INR   card consult for clearance for possible OR .  fall precaution  dvt ppx

## 2019-08-06 DIAGNOSIS — L03.011 CELLULITIS OF RIGHT FINGER: ICD-10-CM

## 2019-08-06 DIAGNOSIS — S62.604S: ICD-10-CM

## 2019-08-06 LAB
BASOPHILS # BLD AUTO: 0.06 K/UL — SIGNIFICANT CHANGE UP (ref 0–0.2)
BASOPHILS NFR BLD AUTO: 1 % — SIGNIFICANT CHANGE UP (ref 0–2)
CRP SERPL-MCNC: 0.35 MG/DL — SIGNIFICANT CHANGE UP (ref 0–0.4)
EOSINOPHIL # BLD AUTO: 0.34 K/UL — SIGNIFICANT CHANGE UP (ref 0–0.5)
EOSINOPHIL NFR BLD AUTO: 5.7 % — SIGNIFICANT CHANGE UP (ref 0–6)
HBA1C BLD-MCNC: 8.3 % — HIGH (ref 4–5.6)
HCT VFR BLD CALC: 38.5 % — LOW (ref 39–50)
HGB BLD-MCNC: 12.8 G/DL — LOW (ref 13–17)
IMM GRANULOCYTES NFR BLD AUTO: 0.3 % — SIGNIFICANT CHANGE UP (ref 0–1.5)
INR BLD: 2.76 RATIO — HIGH (ref 0.88–1.16)
LYMPHOCYTES # BLD AUTO: 0.87 K/UL — LOW (ref 1–3.3)
LYMPHOCYTES # BLD AUTO: 14.6 % — SIGNIFICANT CHANGE UP (ref 13–44)
MCHC RBC-ENTMCNC: 31.2 PG — SIGNIFICANT CHANGE UP (ref 27–34)
MCHC RBC-ENTMCNC: 33.2 GM/DL — SIGNIFICANT CHANGE UP (ref 32–36)
MCV RBC AUTO: 93.9 FL — SIGNIFICANT CHANGE UP (ref 80–100)
MONOCYTES # BLD AUTO: 0.55 K/UL — SIGNIFICANT CHANGE UP (ref 0–0.9)
MONOCYTES NFR BLD AUTO: 9.3 % — SIGNIFICANT CHANGE UP (ref 2–14)
NEUTROPHILS # BLD AUTO: 4.1 K/UL — SIGNIFICANT CHANGE UP (ref 1.8–7.4)
NEUTROPHILS NFR BLD AUTO: 69.1 % — SIGNIFICANT CHANGE UP (ref 43–77)
NRBC # BLD: 0 /100 WBCS — SIGNIFICANT CHANGE UP (ref 0–0)
PLATELET # BLD AUTO: 120 K/UL — LOW (ref 150–400)
PROTHROM AB SERPL-ACNC: 32.2 SEC — HIGH (ref 10–12.9)
RBC # BLD: 4.1 M/UL — LOW (ref 4.2–5.8)
RBC # FLD: 13.3 % — SIGNIFICANT CHANGE UP (ref 10.3–14.5)
WBC # BLD: 5.94 K/UL — SIGNIFICANT CHANGE UP (ref 3.8–10.5)
WBC # FLD AUTO: 5.94 K/UL — SIGNIFICANT CHANGE UP (ref 3.8–10.5)

## 2019-08-06 PROCEDURE — 99222 1ST HOSP IP/OBS MODERATE 55: CPT

## 2019-08-06 PROCEDURE — 99233 SBSQ HOSP IP/OBS HIGH 50: CPT

## 2019-08-06 RX ORDER — CEFTRIAXONE 500 MG/1
1000 INJECTION, POWDER, FOR SOLUTION INTRAMUSCULAR; INTRAVENOUS EVERY 24 HOURS
Refills: 0 | Status: DISCONTINUED | OUTPATIENT
Start: 2019-08-06 | End: 2019-08-08

## 2019-08-06 RX ORDER — LISINOPRIL 2.5 MG/1
1 TABLET ORAL
Qty: 0 | Refills: 0 | DISCHARGE

## 2019-08-06 RX ORDER — WARFARIN SODIUM 2.5 MG/1
2.5 TABLET ORAL ONCE
Refills: 0 | Status: COMPLETED | OUTPATIENT
Start: 2019-08-06 | End: 2019-08-06

## 2019-08-06 RX ADMIN — Medication 3: at 12:41

## 2019-08-06 RX ADMIN — Medication 250 MILLIGRAM(S): at 21:16

## 2019-08-06 RX ADMIN — WARFARIN SODIUM 2.5 MILLIGRAM(S): 2.5 TABLET ORAL at 21:27

## 2019-08-06 RX ADMIN — CEFTRIAXONE 100 MILLIGRAM(S): 500 INJECTION, POWDER, FOR SOLUTION INTRAMUSCULAR; INTRAVENOUS at 18:09

## 2019-08-06 RX ADMIN — PIPERACILLIN AND TAZOBACTAM 25 GRAM(S): 4; .5 INJECTION, POWDER, LYOPHILIZED, FOR SOLUTION INTRAVENOUS at 11:30

## 2019-08-06 RX ADMIN — PANTOPRAZOLE SODIUM 40 MILLIGRAM(S): 20 TABLET, DELAYED RELEASE ORAL at 06:04

## 2019-08-06 RX ADMIN — Medication 101 MILLIGRAM(S): at 00:04

## 2019-08-06 RX ADMIN — PIPERACILLIN AND TAZOBACTAM 25 GRAM(S): 4; .5 INJECTION, POWDER, LYOPHILIZED, FOR SOLUTION INTRAVENOUS at 03:08

## 2019-08-06 RX ADMIN — Medication 250 MILLIGRAM(S): at 18:09

## 2019-08-06 RX ADMIN — Medication 1: at 17:42

## 2019-08-06 RX ADMIN — Medication 1 APPLICATION(S): at 00:05

## 2019-08-06 RX ADMIN — Medication 0.12 MILLIGRAM(S): at 12:40

## 2019-08-06 RX ADMIN — Medication 250 MILLIGRAM(S): at 06:04

## 2019-08-06 RX ADMIN — AMLODIPINE BESYLATE 5 MILLIGRAM(S): 2.5 TABLET ORAL at 06:04

## 2019-08-06 NOTE — PROGRESS NOTE ADULT - PROBLEM SELECTOR PLAN 1
Hand surgery Dr. Toth consulted--no surgical intervention indicated at present. Recommends treatment with IV abx. High risk of failure to salvage this finger.   Seen by ID Dr. Reed, and antibiotics changed to Vancomycin and Ceftriaxone, which patient will need for an extended period of time.  f/u blood cultures

## 2019-08-06 NOTE — CONSULT NOTE ADULT - ASSESSMENT
77yo M, w/ PMH/o a.fib. (currently rate-controlled, on digoxin and coumadin), insulin-dependent T2DM, MI, CAD, ischemic cardiomyopathy s/p CABG in 2000, CHF s/p ICD/PPM placement (last echo 4/17 EF 27%; St. Aj device, last interrogation unknown), HLD, skin CA s/p resection, presenting with R index finger pain and swelling with fracture after injury on 8/1, admitted for r/o osteomyelitis.    - Cardio initially consulted for cardiac clearance for possible surgical intervention. Pt consulted by Plastic Surgery who determined no definite indication for acute surgical intervention, outpatient f/u recommended.  - Patient is not complaining of any cardiac symptoms at this time.  - No clear evidence of acute ischemia.  - No acute changes on EKG compared to previous.  - No meaningful evidence of volume overload.  - Previous TTE shows ___.  - BP well controlled, monitor routine hemodynamics.  - Continue ___.  - Monitor and replete lytes, keep K>4, Mg>2.  - Other cardiovascular workup will depend on clinical course.  - All other workup per primary team.  - Will continue to follow with you. 77yo M, w/ PMH/o a.fib. (currently rate-controlled, on digoxin and coumadin), insulin-dependent T2DM, MI, CAD, ischemic cardiomyopathy s/p CABG in 2000, CHF s/p ICD/PPM placement (last echo 4/17 EF 27%; St. Aj device, last interrogation unknown), HLD, skin CA s/p resection, presenting with R index finger pain and swelling with fracture after injury on 8/1, admitted for r/o osteomyelitis.    - Cardio initially consulted for cardiac clearance for possible surgical intervention. Plastic Surgery was consulted, determined no definite indication for acute surgical intervention, outpatient f/u recommended.  - Patient is not complaining of any cardiac symptoms at this time.  - No clear evidence of acute ischemia.  - Known h/o atrial fibrillation. EKG shows ventricular-paced rhythm, no acute changes on EKG compared to previous.  - Echo in 4/17 showed severe LV dysfunction, EF 27%, LAE, moderate MR, and akinetic mid-distal septum, apex, and anterior and inferolateral walls. ICD/PPM placed.  - No meaningful evidence of volume overload on exam.  - BP well controlled, monitor routine hemodynamics.  - Continue home Digoxin and Lipitor.  - INR supratherapeutic, 5.20. Pt received 5mg IV vitamin K. INR now therapeutic, 2.76.  - Monitor and replete lytes, keep K>4, Mg>2.  - Other cardiovascular workup will depend on clinical course.  - All other workup per primary team.  - Will continue to follow with you. 79yo M, w/ PMH/o a.fib. (currently rate-controlled, on digoxin and coumadin), insulin-dependent T2DM, MI, CAD, ischemic cardiomyopathy s/p CABG in 2000, CHF s/p ICD/PPM placement (last echo 4/17 EF 27%; St. Aj device, last interrogation unknown), HLD, skin CA s/p resection, presenting with R index finger pain and swelling with fracture after injury on 8/1, admitted for r/o osteomyelitis.    - Cardio initially consulted for cardiac clearance for possible surgical intervention. Plastic Surgery was consulted, determined no definite indication for acute surgical intervention, outpatient f/u recommended.  - Patient is not complaining of any cardiac symptoms at this time.  - No clear evidence of acute ischemia.  - Known h/o atrial fibrillation. EKG shows ventricular-paced rhythm, no acute changes on EKG compared to previous.  - Echo in 4/17 showed severe LV dysfunction, EF 27%, LAE, moderate MR, and akinetic mid-distal septum, apex, and anterior and inferolateral walls. ICD/PPM placed.  - No meaningful evidence of volume overload on exam.  - BP well controlled, monitor routine hemodynamics.  - Continue home Digoxin and Lipitor.  - INR supra therapeutic, 5.20. Pt received 5mg IV vitamin K. INR now therapeutic, 2.76. Can restart Coumadin now that inr therapeutic, and if no plan for intervention.  - Monitor and replete lytes, keep K>4, Mg>2.  - Other cardiovascular workup will depend on clinical course.  - All other workup per primary team.  - Will continue to follow with you.

## 2019-08-06 NOTE — PROGRESS NOTE ADULT - SUBJECTIVE AND OBJECTIVE BOX
Improved pain since last pm.  Case discussed today w cards team and nursing.  VSS  Alert and oriented.  Right index finger w mildly improved swelling, erythema and tenderness, but still draining.  Discussed at length w pt again re: options/alts/risks and cxs as well as significant realistic chances of NOT being able to salvage the finger but he wishes efforts made at salvaging it even if the chances are very slim.  No need for surgical intervention at this time as finger already draining.  Rec: Consider MRI/ID eval/local wound care (ordered by me)/iv abx as per ID/follow up in a few days as out-ptin my office.  Most likely requires PICC line.  Please call me prn as in-pt.

## 2019-08-06 NOTE — CONSULT NOTE ADULT - PROBLEM SELECTOR RECOMMENDATION 3
appears chronic on review-will adj abx accordingly.    Thank you for consulting us and involving us in the management of this most interesting and challenging case.     We will follow along in the care of this patient.

## 2019-08-06 NOTE — CONSULT NOTE ADULT - ASSESSMENT
78 year old male PMH AFib (on coumadin), DM II (on insulin) CAD, GERD, hyperlipidemia, ischemic cardiomyopathy, skin ca (?squamous cell s/p resection) and pshx of CABG, PPM/ICD presents with right index finger pain and swelling after smashing and breaking finger in garage door

## 2019-08-06 NOTE — CONSULT NOTE ADULT - SUBJECTIVE AND OBJECTIVE BOX
CHARTING IN PROGRESS      Patient is a 78y old  Male who presents with a chief complaint of finger swelling and pain (05 Aug 2019 21:58)      HPI:  78 year old male PMH AFib (on coumadin), DM II (on insulin) CAD, GERD, hyperlipidemia, ischemic cardiomyopathy, skin ca (?squamous cell s/p resection) and pshx of CABG, PPM/ICD presents with right index finger pain and swelling. History obtained from patient and son at bedside. Patient was opening garage door on Thursday when his right index finger was caught between panels of the door. Initially applied Neosporin with bandage with persitent swelling. Went to  today, advised to present to the ED for evaluation. Denies fevers, chills, headaches, dizziness, numbness, tingling, loss of sensation. UTD with tetanus vaccine.     In the ED: VSS, CBC wnl, INR 5.20, PTT 62.4, BUN 29, Cr 1.60, glucose 177, Alk  phos 123, GFR 41, Xray hand: 1. Significant comminuted/displaced fracture involving 2nd middle phalanx, 2. Also fracture involvement of 2nd distal phalanx.3. Pathologic component of this fracture must be considered. Given Ancef x1, 1 L ns bolus, tdap. EKG V paced rate 64 (05 Aug 2019 19:15)      Interval Events:      PAST MEDICAL & SURGICAL HISTORY:  Type 2 diabetes mellitus  H/O squamous cell carcinoma excision  Chronic GERD  Ventricular Arrhythmia  ICD (Implantable Cardiac Defibrillator) in Place  Hyperlipidemia  Ischemic Cardiomyopathy  CAD (Coronary Artery Disease)  CABG (Coronary Artery Bypass Graft)  Atrial Fibrillation  S/P ICD (internal cardiac defibrillator) procedure  S/P placement of cardiac pacemaker  S/P CABG x 4: 2/13/00      MEDICATIONS  (STANDING):  amLODIPine   Tablet 5 milliGRAM(s) Oral daily  dextrose 5%. 1000 milliLiter(s) (50 mL/Hr) IV Continuous <Continuous>  dextrose 50% Injectable 12.5 Gram(s) IV Push once  dextrose 50% Injectable 25 Gram(s) IV Push once  dextrose 50% Injectable 25 Gram(s) IV Push once  digoxin     Tablet 0.125 milliGRAM(s) Oral every other day  insulin lispro (HumaLOG) corrective regimen sliding scale   SubCutaneous three times a day before meals  insulin lispro (HumaLOG) corrective regimen sliding scale   SubCutaneous at bedtime  pantoprazole    Tablet 40 milliGRAM(s) Oral before breakfast  piperacillin/tazobactam IVPB.. 3.375 Gram(s) IV Intermittent every 8 hours  saccharomyces boulardii 250 milliGRAM(s) Oral two times a day  sodium chloride 0.9%. 1000 milliLiter(s) (50 mL/Hr) IV Continuous <Continuous>  vancomycin  IVPB 1000 milliGRAM(s) IV Intermittent every 24 hours    MEDICATIONS  (PRN):  acetaminophen   Tablet .. 650 milliGRAM(s) Oral every 6 hours PRN Mild Pain (1 - 3)  dextrose 40% Gel 15 Gram(s) Oral once PRN Blood Glucose LESS THAN 70 milliGRAM(s)/deciliter  glucagon  Injectable 1 milliGRAM(s) IntraMuscular once PRN Glucose LESS THAN 70 milligrams/deciliter      FAMILY HISTORY:  No pertinent family history in first degree relatives      Constitutional: denies fever, chills  HEENT: denies blurry vision, difficulty hearing  Respiratory: denies SOB, SMITH, cough  Cardiovascular: denies CP, palpitations, orthopnea, PND, LE edema  Gastrointestinal: denies nausea, vomiting, abdominal pain  Genitourinary: denies urinary changes  Skin: Denies rashes, itching  Neurologic: denies headache, weakness, dizziness  Hematology/Oncology: + easy bruising; denies active bleeding  ROS negative except as noted above      SOCIAL HISTORY:  No tobacco, Alcohol or Ddrug use        Vital Signs Last 24 Hrs  T(C): 36.3 (06 Aug 2019 05:21), Max: 36.9 (05 Aug 2019 21:30)  T(F): 97.4 (06 Aug 2019 05:21), Max: 98.4 (05 Aug 2019 21:30)  HR: 62 (06 Aug 2019 05:21) (62 - 74)  BP: 122/70 (06 Aug 2019 05:21) (122/70 - 136/77)  BP(mean): --  RR: 17 (06 Aug 2019 05:21) (16 - 19)  SpO2: 97% (06 Aug 2019 05:21) (95% - 99%)    Physical Exam:  General: disheveled appearance, in NAD  HEENT: NCAT, EOMI bl, moist mucous membranes   Neck: dressing covering previously bleeding R neck lesion, round friable 1-1.5 cm lesion near R clavicular head  Neurology: A&Ox3, nonfocal, sensation intact   Respiratory: CTA B/L, No W/R/R  CV: RRR, +S1/S2, no murmurs, rubs or gallops  Abdominal: Soft, NT, ND +BSx4, no palpable masses  Extremities: chronic venous stasis dermatitis B/L LE with nonpitting edema  MSK: R index finger wrapped  Heme: diffuse ecchymoses on B/L UE  Skin: thin and dry    ECG: ventricualr-paced rhythm with frequent PVCs          LABS:                        12.8   x     )-----------( x        ( 06 Aug 2019 07:42 )             38.5     08-05    140  |  104  |  29<H>  ----------------------------<  177<H>  4.3   |  28  |  1.60<H>    Ca    9.7      05 Aug 2019 18:22    TPro  8.3  /  Alb  3.6  /  TBili  1.0  /  DBili  x   /  AST  35  /  ALT  38  /  AlkPhos  123<H>  08-05        PT/INR - ( 06 Aug 2019 07:42 )   PT: 32.2 sec;   INR: 2.76 ratio         PTT - ( 05 Aug 2019 18:22 )  PTT:58.3 sec      RADIOLOGY & ADDITIONAL STUDIES: Patient is a 78y old  Male who presents with a chief complaint of finger swelling and pain (05 Aug 2019 21:58)      HPI:  78 year old male PMH AFib (on coumadin), DM II (on insulin) CAD, GERD, hyperlipidemia, ischemic cardiomyopathy, skin ca (?squamous cell s/p resection) and pshx of CABG, PPM/ICD presents with right index finger pain and swelling. History obtained from patient and son at bedside. Patient was opening garage door on Thursday when his right index finger was caught between panels of the door. Initially applied Neosporin with bandage with persitent swelling. Went to  today, advised to present to the ED for evaluation. Denies fevers, chills, headaches, dizziness, numbness, tingling, loss of sensation. UTD with tetanus vaccine.     In the ED: VSS, CBC wnl, INR 5.20, PTT 62.4, BUN 29, Cr 1.60, glucose 177, Alk  phos 123, GFR 41, Xray hand: 1. Significant comminuted/displaced fracture involving 2nd middle phalanx, 2. Also fracture involvement of 2nd distal phalanx.3. Pathologic component of this fracture must be considered. Given Ancef x1, 1 L ns bolus, tdap. EKG V paced rate 64 (05 Aug 2019 19:15)      Interval Events: Pt seen and examined at bedside. No acute complaints. Other than chronic LE swelling, denies fevers, chills, headaches, vision changes, chest pain, palpitations, SOB, orthopnea, PND, abdominal pain, N/V/D/C, urinary symptoms. Previously followed with Dr. Arnulfo Lux but has not seen cardiologist since 2017. Care managed by REBECCA Donahue.      PAST MEDICAL & SURGICAL HISTORY:  Type 2 diabetes mellitus  H/O squamous cell carcinoma excision  Chronic GERD  Ventricular Arrhythmia  ICD (Implantable Cardiac Defibrillator) in Place  Hyperlipidemia  Ischemic Cardiomyopathy  CAD (Coronary Artery Disease)  CABG (Coronary Artery Bypass Graft)  Atrial Fibrillation  S/P ICD (internal cardiac defibrillator) procedure  S/P placement of cardiac pacemaker  S/P CABG x 4: 2/13/00      MEDICATIONS  (STANDING):  amLODIPine   Tablet 5 milliGRAM(s) Oral daily  dextrose 5%. 1000 milliLiter(s) (50 mL/Hr) IV Continuous <Continuous>  dextrose 50% Injectable 12.5 Gram(s) IV Push once  dextrose 50% Injectable 25 Gram(s) IV Push once  dextrose 50% Injectable 25 Gram(s) IV Push once  digoxin     Tablet 0.125 milliGRAM(s) Oral every other day  insulin lispro (HumaLOG) corrective regimen sliding scale   SubCutaneous three times a day before meals  insulin lispro (HumaLOG) corrective regimen sliding scale   SubCutaneous at bedtime  pantoprazole    Tablet 40 milliGRAM(s) Oral before breakfast  piperacillin/tazobactam IVPB.. 3.375 Gram(s) IV Intermittent every 8 hours  saccharomyces boulardii 250 milliGRAM(s) Oral two times a day  sodium chloride 0.9%. 1000 milliLiter(s) (50 mL/Hr) IV Continuous <Continuous>  vancomycin  IVPB 1000 milliGRAM(s) IV Intermittent every 24 hours    MEDICATIONS  (PRN):  acetaminophen   Tablet .. 650 milliGRAM(s) Oral every 6 hours PRN Mild Pain (1 - 3)  dextrose 40% Gel 15 Gram(s) Oral once PRN Blood Glucose LESS THAN 70 milliGRAM(s)/deciliter  glucagon  Injectable 1 milliGRAM(s) IntraMuscular once PRN Glucose LESS THAN 70 milligrams/deciliter      FAMILY HISTORY:  No pertinent family history in first degree relatives      Constitutional: denies fever, chills  HEENT: denies blurry vision, difficulty hearing  Respiratory: denies SOB, SMITH, cough  Cardiovascular: denies CP, palpitations, orthopnea, PND, LE edema  Gastrointestinal: denies nausea, vomiting, abdominal pain  Genitourinary: denies urinary changes  Skin: Denies rashes, itching  Neurologic: denies headache, weakness, dizziness  Hematology/Oncology: + easy bruising; denies active bleeding  ROS negative except as noted above      SOCIAL HISTORY:  No tobacco, Alcohol or Ddrug use        Vital Signs Last 24 Hrs  T(C): 36.3 (06 Aug 2019 05:21), Max: 36.9 (05 Aug 2019 21:30)  T(F): 97.4 (06 Aug 2019 05:21), Max: 98.4 (05 Aug 2019 21:30)  HR: 62 (06 Aug 2019 05:21) (62 - 74)  BP: 122/70 (06 Aug 2019 05:21) (122/70 - 136/77)  BP(mean): --  RR: 17 (06 Aug 2019 05:21) (16 - 19)  SpO2: 97% (06 Aug 2019 05:21) (95% - 99%)    Physical Exam:  General: disheveled appearance, in NAD  HEENT: NCAT, EOMI bl, moist mucous membranes   Neck: dressing covering previously bleeding R neck lesion, round friable 1-1.5 cm lesion near R clavicular head  Neurology: A&Ox3, nonfocal, sensation intact   Respiratory: CTA B/L, No W/R/R  CV: RRR, +S1/S2, no murmurs, rubs or gallops  Abdominal: Soft, NT, ND +BSx4, no palpable masses  Extremities: chronic venous stasis dermatitis B/L LE with nonpitting edema  MSK: R index finger wrapped  Heme: diffuse ecchymoses on B/L UE  Skin: thin and dry    ECG: ventricualr-paced rhythm with frequent PVCs          LABS:                        12.8   x     )-----------( x        ( 06 Aug 2019 07:42 )             38.5     08-05    140  |  104  |  29<H>  ----------------------------<  177<H>  4.3   |  28  |  1.60<H>    Ca    9.7      05 Aug 2019 18:22    TPro  8.3  /  Alb  3.6  /  TBili  1.0  /  DBili  x   /  AST  35  /  ALT  38  /  AlkPhos  123<H>  08-05        PT/INR - ( 06 Aug 2019 07:42 )   PT: 32.2 sec;   INR: 2.76 ratio         PTT - ( 05 Aug 2019 18:22 )  PTT:58.3 sec      RADIOLOGY & ADDITIONAL STUDIES:

## 2019-08-06 NOTE — PROGRESS NOTE ADULT - SUBJECTIVE AND OBJECTIVE BOX
ADMISSION HPI:  78 year old male PMH AFib (on coumadin), DM II (on insulin) CAD, GERD, hyperlipidemia, ischemic cardiomyopathy, skin ca (?squamous cell s/p resection) and pshx of CABG, PPM/ICD presents with right index finger pain and swelling. History obtained from patient and son at bedside. Patient was opening garage door on Thursday when his right index finger was caught between panels of the door. Initially applied Neosporin with bandage with persitent swelling. Went to  today, advised to present to the ED for evaluation. Denies fevers, chills, headaches, dizziness, numbness, tingling, loss of sensation. UTD with tetanus vaccine.     In the ED: VSS, CBC wnl, INR 5.20, PTT 62.4, BUN 29, Cr 1.60, glucose 177, Alk  phos 123, GFR 41, Xray hand: 1. Significant comminuted/displaced fracture involving 2nd middle phalanx, 2. Also fracture involvement of 2nd distal phalanx.3. Pathologic component of this fracture must be considered. Given Ancef x1, 1 L ns bolus, tdap. EKG V paced rate 64 (05 Aug 2019 19:15)    INTERVAL HPI:  Patient seen and examined. No acute overnight events. He feels well today. Denies pain in finger currently. He denies fever/chills/CP/SOB/palpitations/N/V/D.     REVIEW OF SYSTEMS:    CONSTITUTIONAL: No weakness, fevers, or chills  EYES/ENT: No visual changes, no throat pain   RESPIRATORY: No cough, wheezing, hemoptysis; No shortness of breath  CARDIOVASCULAR: No chest pain or palpitations  GASTROINTESTINAL: No abdominal pain, nausea, vomiting, or diarrhea  GENITOURINARY: No dysuria, frequency or hematuria  NEUROLOGICAL: No dizziness, numbness, or weakness  SKIN: Multiple wounds after skin cancer excisions.  All other review of systems is negative unless indicated above.    VITAL SIGNS:  Vital Signs Last 24 Hrs  T(C): 37 (08-06-19 @ 12:44), Max: 37 (08-06-19 @ 12:44)  T(F): 98.6 (08-06-19 @ 12:44), Max: 98.6 (08-06-19 @ 12:44)  HR: 60 (08-06-19 @ 12:44) (60 - 70)  BP: 101/62 (08-06-19 @ 12:44) (101/62 - 136/77)  BP(mean): --  RR: 17 (08-06-19 @ 12:44) (16 - 17)  SpO2: 96% (08-06-19 @ 12:44) (95% - 98%)      PHYSICAL EXAM:     GENERAL: elderly gentleman, no acute distress  HEENT: NC/AT, EOMI, neck supple, MMM, +wound from skin CA excision, with dry crusting blood    RESPIRATORY: LCTAB/L, no rhonchi, rales, or wheezing  CARDIOVASCULAR: RRR, no murmurs, gallops, rubs  ABDOMINAL: soft, non-tender, non-distended, positive bowel sounds   EXTREMITIES: no clubbing, cyanosis, or edema  NEUROLOGICAL: awake and alert, grossly non-focal  SKIN: Skin CA excision wound on chest above sternum, multiple AKs and SKs over face/forehead.  MUSCULOSKELETAL: Right index finger in bukly                           12.8   5.94  )-----------( 120      ( 06 Aug 2019 07:42 )             38.5     08-05    140  |  104  |  29<H>  ----------------------------<  177<H>  4.3   |  28  |  1.60<H>    Ca    9.7      05 Aug 2019 18:22    TPro  8.3  /  Alb  3.6  /  TBili  1.0  /  DBili  x   /  AST  35  /  ALT  38  /  AlkPhos  123<H>  08-05    PT/INR - ( 06 Aug 2019 07:42 )   PT: 32.2 sec;   INR: 2.76 ratio         PTT - ( 05 Aug 2019 18:22 )  PTT:58.3 sec  CAPILLARY BLOOD GLUCOSE      POCT Blood Glucose.: 161 mg/dL (06 Aug 2019 17:20)  POCT Blood Glucose.: 252 mg/dL (06 Aug 2019 12:16)  POCT Blood Glucose.: 132 mg/dL (06 Aug 2019 08:26)  POCT Blood Glucose.: 141 mg/dL (05 Aug 2019 22:32)          MEDICATIONS  (STANDING):  amLODIPine   Tablet 5 milliGRAM(s) Oral daily  cefTRIAXone   IVPB 1000 milliGRAM(s) IV Intermittent every 24 hours  dextrose 5%. 1000 milliLiter(s) (50 mL/Hr) IV Continuous <Continuous>  dextrose 50% Injectable 12.5 Gram(s) IV Push once  dextrose 50% Injectable 25 Gram(s) IV Push once  dextrose 50% Injectable 25 Gram(s) IV Push once  digoxin     Tablet 0.125 milliGRAM(s) Oral every other day  insulin lispro (HumaLOG) corrective regimen sliding scale   SubCutaneous three times a day before meals  insulin lispro (HumaLOG) corrective regimen sliding scale   SubCutaneous at bedtime  pantoprazole    Tablet 40 milliGRAM(s) Oral before breakfast  saccharomyces boulardii 250 milliGRAM(s) Oral two times a day  sodium chloride 0.9%. 1000 milliLiter(s) (50 mL/Hr) IV Continuous <Continuous>  vancomycin  IVPB 1000 milliGRAM(s) IV Intermittent every 24 hours    MEDICATIONS  (PRN):  acetaminophen   Tablet .. 650 milliGRAM(s) Oral every 6 hours PRN Mild Pain (1 - 3)  dextrose 40% Gel 15 Gram(s) Oral once PRN Blood Glucose LESS THAN 70 milliGRAM(s)/deciliter  glucagon  Injectable 1 milliGRAM(s) IntraMuscular once PRN Glucose LESS THAN 70 milligrams/deciliter

## 2019-08-06 NOTE — CHART NOTE - NSCHARTNOTEFT_GEN_A_CORE
Called by RN for Pt c/o bleeding at the site of the neck laceration.        T(C): 36.5 (08-05-19 @ 22:25), Max: 36.9 (08-05-19 @ 21:30)  HR: 64 (08-05-19 @ 22:25) (64 - 74)  BP: 136/77 (08-05-19 @ 22:25) (122/73 - 136/77)  RR: 17 (08-05-19 @ 22:25) (16 - 19)  SpO2: 95% (08-05-19 @ 22:25) (95% - 99%)  Wt(kg): --    Physical :  Gen- NAD, ncat  Cardio - s+1,s+2, rrr, no murmur  Lung - cta b/l, no wheeze, no rhonchi, no rales   Abdomen- +BS, NT/ND, no guarding, no rebound, no masses  Ext- no edema, 2+ pulses b/l  Neuro- CN grossly intact, strength 5/5 b/l extrem    LABS:                        14.7   5.91  )-----------( 160      ( 05 Aug 2019 18:22 )             45.4     08-05    140  |  104  |  29<H>  ----------------------------<  177<H>  4.3   |  28  |  1.60<H>    Ca    9.7      05 Aug 2019 18:22    TPro  8.3  /  Alb  3.6  /  TBili  1.0  /  DBili  x   /  AST  35  /  ALT  38  /  AlkPhos  123<H>  08-05    PT/INR - ( 05 Aug 2019 18:22 )   PT: 62.4 sec;   INR: 5.20 ratio         PTT - ( 05 Aug 2019 18:22 )  PTT:58.3 sec            Assessment/Plan  78yMale admitted for   1. Called by RN for Pt c/o bleeding at the site of the neck laceration.  RN states patient's bandage on his neck was completely saturated including part of his gown.  Patient was seen and examined at bedside.  Patient states the bandage was moved causing the lesion to bleed.  Patient denies any lightheadedness, SOB, chest pain.      T(C): 36.5 (08-05-19 @ 22:25), Max: 36.9 (08-05-19 @ 21:30)  HR: 64 (08-05-19 @ 22:25) (64 - 74)  BP: 136/77 (08-05-19 @ 22:25) (122/73 - 136/77)  RR: 17 (08-05-19 @ 22:25) (16 - 19)  SpO2: 95% (08-05-19 @ 22:25) (95% - 99%)  Wt(kg): --    Physical :  Gen- NAD, ncat  Skin: 1 mm pinpoint skin tear on right neck actively bleeding, friable skin surrounding area    LABS:                        14.7   5.91  )-----------( 160      ( 05 Aug 2019 18:22 )             45.4     08-05    140  |  104  |  29<H>  ----------------------------<  177<H>  4.3   |  28  |  1.60<H>    Ca    9.7      05 Aug 2019 18:22    TPro  8.3  /  Alb  3.6  /  TBili  1.0  /  DBili  x   /  AST  35  /  ALT  38  /  AlkPhos  123<H>  08-05    PT/INR - ( 05 Aug 2019 18:22 )   PT: 62.4 sec;   INR: 5.20 ratio         PTT - ( 05 Aug 2019 18:22 )  PTT:58.3 sec            Assessment/Plan  78 year old male PMH AFib (on coumadin), DM II (on insulin) CAD, hyperlipidemia, ischemic cardiomyopathy and pshx of CABG, PPM/ICD presents with right index finger pain and swelling. w/u show fracture ??cellulitis hand surgeon  advised treatment for ??osteomyelitis with actively bleeding neck lesion.      - RN advised to hold pressure for 5 minutes and apply bacitracin to area prior to dressing the lesion  - Given INR 5.2 - 5 mg IV vitamin K ordered  - f/u AM INR  - RN to call with any changes Called by RN for Pt c/o bleeding at the site of the neck laceration.  RN states patient's bandage on his neck was completely saturated including part of his gown.  Patient was seen and examined at bedside.  Patient states the bandage was moved causing the lesion to bleed.  Patient denies any lightheadedness, SOB, chest pain.      T(C): 36.5 (08-05-19 @ 22:25), Max: 36.9 (08-05-19 @ 21:30)  HR: 64 (08-05-19 @ 22:25) (64 - 74)  BP: 136/77 (08-05-19 @ 22:25) (122/73 - 136/77)  RR: 17 (08-05-19 @ 22:25) (16 - 19)  SpO2: 95% (08-05-19 @ 22:25) (95% - 99%)  Wt(kg): --    Physical :  Gen- NAD, ncat  Skin: 1 mm pinpoint skin tear on right neck actively bleeding, friable skin surrounding area    LABS:                        14.7   5.91  )-----------( 160      ( 05 Aug 2019 18:22 )             45.4     08-05    140  |  104  |  29<H>  ----------------------------<  177<H>  4.3   |  28  |  1.60<H>    Ca    9.7      05 Aug 2019 18:22    TPro  8.3  /  Alb  3.6  /  TBili  1.0  /  DBili  x   /  AST  35  /  ALT  38  /  AlkPhos  123<H>  08-05    PT/INR - ( 05 Aug 2019 18:22 )   PT: 62.4 sec;   INR: 5.20 ratio         PTT - ( 05 Aug 2019 18:22 )  PTT:58.3 sec            Assessment/Plan  78 year old male PMH AFib (on coumadin), DM II (on insulin) CAD, hyperlipidemia, ischemic cardiomyopathy and pshx of CABG, PPM/ICD presents with right index finger pain and swelling. w/u show fracture ??cellulitis hand surgeon  advised treatment for ??osteomyelitis with actively bleeding neck lesion.      - RN advised to hold pressure for 5 minutes and apply bacitracin to area prior to dressing the lesion  - Given INR 5.2 - 5 mg IV vitamin K ordered  - f/u AM INR & H/H  - RN to call with any changes

## 2019-08-06 NOTE — PROGRESS NOTE ADULT - PROBLEM SELECTOR PLAN 3
Chronic, stable  Continue Digoxin .125 mg every other day  On Coumadin now therapeutic. will dose tonight.

## 2019-08-06 NOTE — PROGRESS NOTE ADULT - ASSESSMENT
78 year old male PMH AFib (on coumadin), DM II (on insulin) CAD, hyperlipidemia, ischemic cardiomyopathy and pshx of CABG, PPM/ICD presents with right index finger pain and swelling. w/u show fracture ??cellulitis hand surgeon  advised treatment for ??osteomyelitis

## 2019-08-06 NOTE — CONSULT NOTE ADULT - SUBJECTIVE AND OBJECTIVE BOX
HPI:  78 year old male PMH AFib (on coumadin), DM II (on insulin) CAD, GERD, hyperlipidemia, ischemic cardiomyopathy, skin ca (?squamous cell s/p resection) and pshx of CABG, PPM/ICD presents with right index finger pain and swelling. History obtained from patient and son at bedside. Patient was opening garage door on Thursday when his right index finger was caught between panels of the door. Initially applied Neosporin with bandage with persitent swelling. Went to  today, advised to present to the ED for evaluation. Denies fevers, chills, headaches, dizziness, numbness, tingling, loss of sensation. UTD with tetanus vaccine.     In the ED: VSS, CBC wnl, INR 5.20, PTT 62.4, BUN 29, Cr 1.60, glucose 177, Alk  phos 123, GFR 41, Xray hand: 1. Significant comminuted/displaced fracture involving 2nd middle phalanx, 2. Also fracture involvement of 2nd distal phalanx.3. Pathologic component of this fracture must be considered. Given Ancef x1, 1 L ns bolus, tdap. EKG V paced rate 64 (05 Aug 2019 19:15)      PAST MEDICAL & SURGICAL HISTORY:  Type 2 diabetes mellitus  H/O squamous cell carcinoma excision  Chronic GERD  Ventricular Arrhythmia  ICD (Implantable Cardiac Defibrillator) in Place  Hyperlipidemia  Ischemic Cardiomyopathy  CAD (Coronary Artery Disease)  CABG (Coronary Artery Bypass Graft)  Atrial Fibrillation  S/P ICD (internal cardiac defibrillator) procedure  S/P placement of cardiac pacemaker  S/P CABG x 4: 2/13/00      Antimicrobials  cefTRIAXone   IVPB 1000 milliGRAM(s) IV Intermittent every 24 hours  vancomycin  IVPB 1000 milliGRAM(s) IV Intermittent every 24 hours      Immunological      Other  acetaminophen   Tablet .. 650 milliGRAM(s) Oral every 6 hours PRN  amLODIPine   Tablet 5 milliGRAM(s) Oral daily  dextrose 40% Gel 15 Gram(s) Oral once PRN  dextrose 5%. 1000 milliLiter(s) IV Continuous <Continuous>  dextrose 50% Injectable 12.5 Gram(s) IV Push once  dextrose 50% Injectable 25 Gram(s) IV Push once  dextrose 50% Injectable 25 Gram(s) IV Push once  digoxin     Tablet 0.125 milliGRAM(s) Oral every other day  glucagon  Injectable 1 milliGRAM(s) IntraMuscular once PRN  insulin lispro (HumaLOG) corrective regimen sliding scale   SubCutaneous three times a day before meals  insulin lispro (HumaLOG) corrective regimen sliding scale   SubCutaneous at bedtime  pantoprazole    Tablet 40 milliGRAM(s) Oral before breakfast  saccharomyces boulardii 250 milliGRAM(s) Oral two times a day  sodium chloride 0.9%. 1000 milliLiter(s) IV Continuous <Continuous>      Allergies    No Known Allergies    Intolerances      SOCIAL HISTORY: no toxic habits reported    FAMILY HISTORY:  No pertinent family history in first degree relatives      ROS:    EYES:  Negative  blurry vision or double vision  GASTROINTESTINAL:  Negative for nausea, vomiting, diarrhea  -otherwise negative except for subjective    Vital Signs Last 24 Hrs  T(C): 37 (06 Aug 2019 12:44), Max: 37 (06 Aug 2019 12:44)  T(F): 98.6 (06 Aug 2019 12:44), Max: 98.6 (06 Aug 2019 12:44)  HR: 60 (06 Aug 2019 12:44) (60 - 74)  BP: 101/62 (06 Aug 2019 12:44) (101/62 - 136/77)  BP(mean): --  RR: 17 (06 Aug 2019 12:44) (16 - 19)  SpO2: 96% (06 Aug 2019 12:44) (95% - 99%)    PE:  WDWN in no distress  HEENT:  NC, PERRL, sclerae anicteric, conjunctivae clear, EOMI.  Sinuses nontender, no nasal exudate.  No buccal or pharyngeal lesions, erythema or exudate  Neck:  Supple, no adenopathy  Lungs:  No accessory muscle use, breathing comfortably  Cor:  RRR  Abd:  Symmetric, normoactive BS.  Soft, nontender, no masses, guarding or rebound.  Liver and spleen not enlarged  Extrem/Skin: right index finger with swelling, erythema, some drainage  Neuro: grossly intact  Musc: moving all limbs freely, no focal deficits    LABS:                        12.8   5.94  )-----------( 120      ( 06 Aug 2019 07:42 )             38.5       WBC Count: 5.94 K/uL (08-06-19 @ 07:42)  WBC Count: 5.91 K/uL (08-05-19 @ 18:22)      08-05    140  |  104  |  29<H>  ----------------------------<  177<H>  4.3   |  28  |  1.60<H>    Ca    9.7      05 Aug 2019 18:22    TPro  8.3  /  Alb  3.6  /  TBili  1.0  /  DBili  x   /  AST  35  /  ALT  38  /  AlkPhos  123<H>  08-05      Creatinine, Serum: 1.60 mg/dL (08-05-19 @ 18:22)      MICROBIOLOGY:      RADIOLOGY & ADDITIONAL STUDIES:    --< from: Xray Finger, Right Hand (08.05.19 @ 17:57) >  EXAM:  FINGER(S) RIGHT HAND                            PROCEDURE DATE:  08/05/2019          INTERPRETATION:  DATE OF STUDY: 8/5/2019.    COMPARISON: None.    CLINICAL HISTORY:  78-yo-male - status post right index finger trauma.     Technique: 3 right 2nd finger films.    FINDINGS:  Generalized osteopenia is noted.  There is a comminuted fracture through the neck/head of the 2nd middle   phalanx - with separation and displacement of fragments. There is also   subtle fracture of the base of the second middle phalanx. There is also   involvement of the base of the distal phalanx. Associated generalized   soft tissue swelling seen in the region of the fractures. No tracking of   soft tissue air.  The osteopenic changes in this region raise thepossibility of pathologic   fracture in association with the trauma    IMPRESSION:   1. Significant comminuted/displaced fracture involving 2nd middle   phalanx, as above.  2. Also fracture involvement of 2nd distal phalanx.  3. Pathologic component of this fracture must be considered as above.  This report to ER via PACs system.

## 2019-08-06 NOTE — CONSULT NOTE ADULT - PROBLEM SELECTOR RECOMMENDATION 9
with open fracture high risk for underlying osteo and loss of finger without abx. No water exposure so no neeed for pseudomonas coverage. Have changed abx to Vanco/ceftriaxone and if pt tolerates this and improves will look at possible 6 weeks of   IV abx.

## 2019-08-07 ENCOUNTER — TRANSCRIPTION ENCOUNTER (OUTPATIENT)
Age: 79
End: 2019-08-07

## 2019-08-07 DIAGNOSIS — R79.1 ABNORMAL COAGULATION PROFILE: ICD-10-CM

## 2019-08-07 LAB
ANION GAP SERPL CALC-SCNC: 10 MMOL/L — SIGNIFICANT CHANGE UP (ref 5–17)
BUN SERPL-MCNC: 25 MG/DL — HIGH (ref 7–23)
CALCIUM SERPL-MCNC: 9.7 MG/DL — SIGNIFICANT CHANGE UP (ref 8.5–10.1)
CHLORIDE SERPL-SCNC: 107 MMOL/L — SIGNIFICANT CHANGE UP (ref 96–108)
CO2 SERPL-SCNC: 23 MMOL/L — SIGNIFICANT CHANGE UP (ref 22–31)
CREAT SERPL-MCNC: 1.5 MG/DL — HIGH (ref 0.5–1.3)
GLUCOSE SERPL-MCNC: 173 MG/DL — HIGH (ref 70–99)
HCT VFR BLD CALC: 40.3 % — SIGNIFICANT CHANGE UP (ref 39–50)
HGB BLD-MCNC: 13.3 G/DL — SIGNIFICANT CHANGE UP (ref 13–17)
INR BLD: 1.48 RATIO — HIGH (ref 0.88–1.16)
MCHC RBC-ENTMCNC: 31.1 PG — SIGNIFICANT CHANGE UP (ref 27–34)
MCHC RBC-ENTMCNC: 33 GM/DL — SIGNIFICANT CHANGE UP (ref 32–36)
MCV RBC AUTO: 94.2 FL — SIGNIFICANT CHANGE UP (ref 80–100)
NRBC # BLD: 0 /100 WBCS — SIGNIFICANT CHANGE UP (ref 0–0)
PLATELET # BLD AUTO: 128 K/UL — LOW (ref 150–400)
POTASSIUM SERPL-MCNC: 3.9 MMOL/L — SIGNIFICANT CHANGE UP (ref 3.5–5.3)
POTASSIUM SERPL-SCNC: 3.9 MMOL/L — SIGNIFICANT CHANGE UP (ref 3.5–5.3)
PROTHROM AB SERPL-ACNC: 17.1 SEC — HIGH (ref 10–12.9)
RBC # BLD: 4.28 M/UL — SIGNIFICANT CHANGE UP (ref 4.2–5.8)
RBC # FLD: 13.2 % — SIGNIFICANT CHANGE UP (ref 10.3–14.5)
SODIUM SERPL-SCNC: 140 MMOL/L — SIGNIFICANT CHANGE UP (ref 135–145)
VANCOMYCIN TROUGH SERPL-MCNC: 8.8 UG/ML — LOW (ref 10–20)
WBC # BLD: 6.73 K/UL — SIGNIFICANT CHANGE UP (ref 3.8–10.5)
WBC # FLD AUTO: 6.73 K/UL — SIGNIFICANT CHANGE UP (ref 3.8–10.5)

## 2019-08-07 PROCEDURE — 99233 SBSQ HOSP IP/OBS HIGH 50: CPT | Mod: GC

## 2019-08-07 PROCEDURE — 99232 SBSQ HOSP IP/OBS MODERATE 35: CPT

## 2019-08-07 RX ORDER — ATORVASTATIN CALCIUM 80 MG/1
10 TABLET, FILM COATED ORAL AT BEDTIME
Refills: 0 | Status: DISCONTINUED | OUTPATIENT
Start: 2019-08-07 | End: 2019-08-08

## 2019-08-07 RX ORDER — CEFTRIAXONE 500 MG/1
2 INJECTION, POWDER, FOR SOLUTION INTRAMUSCULAR; INTRAVENOUS
Qty: 2 | Refills: 0
Start: 2019-08-07 | End: 2019-09-15

## 2019-08-07 RX ORDER — RIVAROXABAN 15 MG-20MG
1 KIT ORAL
Qty: 42 | Refills: 0
Start: 2019-08-07 | End: 2019-08-27

## 2019-08-07 RX ORDER — BACITRACIN ZINC 500 UNIT/G
1 OINTMENT IN PACKET (EA) TOPICAL
Refills: 0 | Status: DISCONTINUED | OUTPATIENT
Start: 2019-08-07 | End: 2019-08-08

## 2019-08-07 RX ORDER — APIXABAN 2.5 MG/1
5 TABLET, FILM COATED ORAL EVERY 12 HOURS
Refills: 0 | Status: DISCONTINUED | OUTPATIENT
Start: 2019-08-07 | End: 2019-08-07

## 2019-08-07 RX ORDER — METOPROLOL TARTRATE 50 MG
25 TABLET ORAL DAILY
Refills: 0 | Status: DISCONTINUED | OUTPATIENT
Start: 2019-08-07 | End: 2019-08-08

## 2019-08-07 RX ORDER — APIXABAN 2.5 MG/1
1 TABLET, FILM COATED ORAL
Qty: 60 | Refills: 0
Start: 2019-08-07 | End: 2019-09-05

## 2019-08-07 RX ORDER — VANCOMYCIN HCL 1 G
1 VIAL (EA) INTRAVENOUS
Qty: 1 | Refills: 0
Start: 2019-08-07 | End: 2019-09-15

## 2019-08-07 RX ADMIN — Medication 250 MILLIGRAM(S): at 22:37

## 2019-08-07 RX ADMIN — Medication 25 MILLIGRAM(S): at 17:26

## 2019-08-07 RX ADMIN — PANTOPRAZOLE SODIUM 40 MILLIGRAM(S): 20 TABLET, DELAYED RELEASE ORAL at 06:13

## 2019-08-07 RX ADMIN — Medication 2: at 17:34

## 2019-08-07 RX ADMIN — Medication 1: at 08:42

## 2019-08-07 RX ADMIN — Medication 1 APPLICATION(S): at 17:16

## 2019-08-07 RX ADMIN — Medication 250 MILLIGRAM(S): at 05:32

## 2019-08-07 RX ADMIN — CEFTRIAXONE 100 MILLIGRAM(S): 500 INJECTION, POWDER, FOR SOLUTION INTRAMUSCULAR; INTRAVENOUS at 17:17

## 2019-08-07 RX ADMIN — Medication 250 MILLIGRAM(S): at 17:17

## 2019-08-07 RX ADMIN — Medication 3: at 12:43

## 2019-08-07 RX ADMIN — ATORVASTATIN CALCIUM 10 MILLIGRAM(S): 80 TABLET, FILM COATED ORAL at 22:37

## 2019-08-07 RX ADMIN — AMLODIPINE BESYLATE 5 MILLIGRAM(S): 2.5 TABLET ORAL at 05:32

## 2019-08-07 NOTE — PROGRESS NOTE ADULT - ASSESSMENT
78 year old male PMH AFib (on coumadin), DM II (on insulin) CAD, hyperlipidemia, ischemic cardiomyopathy and pshx of CABG, PPM/ICD presents with right index finger pain and swelling, found to have open, displaced fractures of R second digit with high risk of osteomyelitis. Currently on ceftriaxone and vancomycin.

## 2019-08-07 NOTE — DISCHARGE NOTE PROVIDER - CARE PROVIDERS DIRECT ADDRESSES
,DirectAddress_Unknown,DirectAddress_Unknown ,DirectAddress_Unknown,DirectAddress_Unknown,[email or web address] ,DirectAddress_Unknown,DirectAddress_Unknown,[email or web address],DirectAddress_Unknown

## 2019-08-07 NOTE — DISCHARGE NOTE PROVIDER - HOSPITAL COURSE
Patient is a 78y old  Male who presents with a chief complaint of finger swelling and pain (07 Aug 2019 16:26)            FROM ADMISSION H+P:     HPI:    78 year old male PMH AFib (on coumadin), DM II (on insulin) CAD, GERD, hyperlipidemia, ischemic cardiomyopathy, skin ca (?squamous cell s/p resection) and pshx of CABG, PPM/ICD presents with right index finger pain and swelling. History obtained from patient and son at bedside. Patient was opening garage door on Thursday when his right index finger was caught between panels of the door. Initially applied Neosporin with bandage with persitent swelling. Went to  today, advised to present to the ED for evaluation. Denies fevers, chills, headaches, dizziness, numbness, tingling, loss of sensation. UTD with tetanus vaccine.         In the ED: VSS, CBC wnl, INR 5.20, PTT 62.4, BUN 29, Cr 1.60, glucose 177, Alk  phos 123, GFR 41, Xray hand: 1. Significant comminuted/displaced fracture involving 2nd middle phalanx, 2. Also fracture involvement of 2nd distal phalanx. 3. Pathologic component of this fracture must be considered. Given Ancef x1, 1 L ns bolus, tdap. EKG V paced rate 64.             ----    HOSPITAL COURSE: Patient was admitted to general medical floor for management of open fracture of the right index finger, suspected osteomyelitis, which was draining. Patient received 5mg of IV Vitamin K and repeat INR was 2.76. Hand surgeon (Dr. Toth) was consulted. Culture of the wound was sent. It was determined there was no acute need for surgical intervention. Prognosis for salvage of the finger was guarded and risks, benefits, and options were discussed with the patient who determined he would like to pursue efforts made to salvage the finger even if the chances were slim. Infectious disease (Dr. Reed) was consulted. Patient was continued on ceftriaxone and vancomycin and was determined to need long term IV antibiotic treatment requiring a PICC line. Interventional Radiology (Dr. Pearce) was consulted.        ----    PHYSICAL EXAM:    GENERAL: patient appears well, no acute distress, appropriately interactive    EYES: sclera clear, no exudates    ENMT: oropharynx clear without erythema, moist mucous membranes    NECK: supple, soft, no thyromegaly noted    LUNGS: good air entry bilaterally, clear to auscultation, symmetric breath sounds, no wheezing or rhonchi appreciated    HEART: soft S1/S2, regular rate and rhythm, no murmurs noted, no noted edema to b/l LE    GASTROINTESTINAL: abdomen is soft, nontender, nondistended, normoactive bowel sounds, no palpable masses    INTEGUMENT: good skin turgor, appropriate for ethnicity, appears well perfused, no jaundice noted    MUSCULOSKELETAL: no clubbing or cyanosis, no obvious deformity    NEUROLOGIC: awake, alert, oriented x3, good muscle tone in 4 extremities, no obvious sensory deficits    PSYCHIATRIC: mood is good, affect is congruent with mood, linear and logical thought process    HEME/LYMPH: no palpable supraclavicular nodules, no obvious ecchymosis         T(C): 36.6 (08-07-19 @ 13:01), Max: 36.6 (08-07-19 @ 13:01)    HR: 61 (08-07-19 @ 13:01) (59 - 64)    BP: 122/68 (08-07-19 @ 13:01) (94/56 - 124/66)    RR: 19 (08-07-19 @ 13:01) (17 - 19)    SpO2: 98% (08-07-19 @ 13:01) (96% - 98%)    Wt(kg): -- Patient is a 78y old  Male who presents with a chief complaint of finger swelling and pain.             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 History obtained from patient and son at bedside. Patient was opening garage door on Thursday when his right index finger was caught between panels of the door. Initially applied Neosporin with bandage with persitent swelling. Went to  today, advised to present to the ED for evaluation. Denies fevers, chills, headaches, dizziness, numbness, tingling, loss of sensation. UTD with tetanus vaccine.         In the ED: VSS, CBC wnl, INR 5.20, PTT 62.4, BUN 29, Cr 1.60, glucose 177, Alk  phos 123, GFR 41, Xray hand: 1. Significant comminuted/displaced fracture involving 2nd middle phalanx, 2. Also fracture involvement of 2nd distal phalanx. 3. Pathologic component of this fracture must be considered. Given Ancef x1, 1 L ns bolus, tdap. EKG V paced rate 64.             ----    HOSPITAL COURSE: Patient was admitted to general medical floor for management of open, draining fracture of the right index finger with suspected osteomyelitis. Patient received 5mg of IV Vitamin K and repeat INR was 2.76. Hand surgeon (Dr. Toth) was consulted. It was determined there was no acute need for surgical intervention. Prognosis for salvage of the finger was guarded and risks, benefits, and options were discussed with the patient who determined he would like to pursue efforts made to salvage the finger even if the chances were slim. Culture of the wound was sent which was found to have few proteus mirabilis. Infectious disease (Dr. Reed) was consulted. Patient was continued on ceftriaxone and vancomycin and was determined to need long term IV antibiotic treatment requiring a PICC line. Patient was found to have QUANG and his home lisinopril was held and amlodipine was started for blood pressure management. Hydration was encourage and patient BUN/Cr was seen to improve over his hospital course. Cardiology (Dr. Garland) was consulted. Patient's home metoprolol, digoxin, and lipitor were continued, but coumadin was held for PICC line procedure. Interventional Radiology (Dr. Pearce) placed a PICC line to facilitate long term antibiotic treatment. Patient tolerated the procedure well with no adverse effects. Patient's vancomycin trough was found to be 8.8 and per ID recommendations patient's vacomycin dose was increased to ______. Efforts were made to switch patient to a novel anticoagulant, but it was found that prior authorization was necessary to initiate Eliquis and Xarelto?. Patient was restarted on his home dose of coumadin and his INR was checked daily. Patient remained hemodynamically stable throughout his hospital course and was seen and examined on the day of discharge.         ----    VITAL SIGNS:    Vital Signs Last 24 Hrs    T(C): 36.8 (08 Aug 2019 05:20), Max: 36.8 (08 Aug 2019 05:20)    T(F): 98.2 (08 Aug 2019 05:20), Max: 98.2 (08 Aug 2019 05:20)    HR: 60 (08 Aug 2019 05:20) (60 - 61)    BP: 148/78 (08 Aug 2019 05:20) (101/60 - 148/78)    RR: 17 (08 Aug 2019 05:20) (17 - 19)    SpO2: 97% (08 Aug 2019 05:20) (94% - 98%)        PHYSICAL EXAM:    GENERAL: patient appears well, no acute distress, appropriately interactive    EYES: sclera clear, no exudates, cataract present in R eye    ENMT: moist mucous membranes    NECK: well-healing skin ca resection site on anterior chest without surrounding erythema, drainage, or purulent discharge    LUNGS: good air entry bilaterally, clear to auscultation, symmetric breath sounds, no wheezing or rhonchi appreciated    HEART: soft S1/S2, regular rate and irregular rhythm, no murmurs noted, no noted edema to b/l LE    GASTROINTESTINAL: abdomen is soft, nontender, nondistended, normoactive bowel sounds, no palpable masses    INTEGUMENT: 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 Garland    ID Dr. Reed    Plastic/Hand Surgery Dr. Toth Patient is a 78y old  Male who presents with a chief complaint of finger swelling and pain.             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 History obtained from patient and son at bedside. Patient was opening garage door on Thursday when his right index finger was caught between panels of the door. Initially applied Neosporin with bandage with persitent swelling. Went to  today, advised to present to the ED for evaluation. Denies fevers, chills, headaches, dizziness, numbness, tingling, loss of sensation. UTD with tetanus vaccine.         In the ED: VSS, CBC wnl, INR 5.20, PTT 62.4, BUN 29, Cr 1.60, glucose 177, Alk  phos 123, GFR 41, Xray hand: 1. Significant comminuted/displaced fracture involving 2nd middle phalanx, 2. Also fracture involvement of 2nd distal phalanx. 3. Pathologic component of this fracture must be considered. Given Ancef x1, 1 L ns bolus, tdap. EKG V paced rate 64.             ----    HOSPITAL COURSE: Patient was admitted to general medical floor for management of open, draining fracture of the right index finger with suspected osteomyelitis. Patient received 5mg of IV Vitamin K and repeat INR was 2.76. Hand surgeon (Dr. Toth) was consulted. It was determined there was no acute need for surgical intervention. Prognosis for salvage of the finger was guarded and risks, benefits, and options were discussed with the patient who determined he would like to pursue efforts made to salvage the finger even if the chances were slim. Culture of the wound was sent which was found to have few proteus mirabilis. Infectious disease (Dr. Reed) was consulted. Patient was continued on ceftriaxone and vancomycin and was determined to need long term IV antibiotic treatment requiring a PICC line. Patient was found to have QUANG and his home lisinopril was held and amlodipine was started for blood pressure management. Hydration was encourage and patient BUN/Cr was seen to improve over his hospital course. Cardiology (Dr. Garland) was consulted. Patient's home metoprolol, digoxin, and lipitor were continued, but coumadin was held for PICC line procedure. Interventional Radiology (Dr. Pearce) placed a PICC line to facilitate long term antibiotic treatment. Patient tolerated the procedure well with no adverse effects. Patient's vancomycin trough was found to be 8.8 and per ID recommendations patient's vacomycin dose was increased to ______. Efforts were made to switch patient to a novel anticoagulant, but it was found that prior authorization was necessary to initiate Eliquis and Xarelto proved to be a financial labor for patient. Patient was restarted on his home dose of coumadin and his INR was checked daily. Patient remained hemodynamically stable throughout his hospital course and was seen and examined on the day of discharge.         ----    VITAL SIGNS:    Vital Signs Last 24 Hrs    T(C): 36.8 (08 Aug 2019 05:20), Max: 36.8 (08 Aug 2019 05:20)    T(F): 98.2 (08 Aug 2019 05:20), Max: 98.2 (08 Aug 2019 05:20)    HR: 60 (08 Aug 2019 05:20) (60 - 61)    BP: 148/78 (08 Aug 2019 05:20) (101/60 - 148/78)    RR: 17 (08 Aug 2019 05:20) (17 - 19)    SpO2: 97% (08 Aug 2019 05:20) (94% - 98%)        PHYSICAL EXAM:    GENERAL: patient appears well, no acute distress, appropriately interactive    EYES: sclera clear, no exudates, cataract present in R eye    ENMT: moist mucous membranes    NECK: well-healing skin ca resection site on anterior chest without surrounding erythema, drainage, or purulent discharge    LUNGS: good air entry bilaterally, clear to auscultation, symmetric breath sounds, no wheezing or rhonchi appreciated    HEART: soft S1/S2, regular rate and irregular rhythm, no murmurs noted, no noted edema to b/l LE    GASTROINTESTINAL: abdomen is soft, nontender, nondistended, normoactive bowel sounds, no palpable masses    INTEGUMENT: 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 Saveandres Reed    Plastic/Hand Surgery Dr. Toth Patient is a 78y old  Male who presents with a chief complaint of finger swelling and pain.             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 History obtained from patient and son at bedside. Patient was opening garage door on Thursday when his right index finger was caught between panels of the door. Initially applied Neosporin with bandage with persitent swelling. Went to  today, advised to present to the ED for evaluation. Denies fevers, chills, headaches, dizziness, numbness, tingling, loss of sensation. UTD with tetanus vaccine.         In the ED: VSS, CBC wnl, INR 5.20, PTT 62.4, BUN 29, Cr 1.60, glucose 177, Alk  phos 123, GFR 41, Xray hand: 1. Significant comminuted/displaced fracture involving 2nd middle phalanx, 2. Also fracture involvement of 2nd distal phalanx. 3. Pathologic component of this fracture must be considered. Given Ancef x1, 1 L ns bolus, tdap. EKG V paced rate 64.             ----    HOSPITAL COURSE: Patient was admitted to general medical floor for management of open, draining fracture of the right index finger with suspected osteomyelitis. Patient received 5mg of IV Vitamin K and repeat INR was 2.76. Hand surgeon (Dr. Toth) was consulted. It was determined there was no acute need for surgical intervention. Prognosis for salvage of the finger was guarded and risks, benefits, and options were discussed with the patient who determined he would like to pursue efforts made to salvage the finger even if the chances were slim. Culture of the wound was sent which was found to have few proteus mirabilis. Infectious disease (Dr. Reed) was consulted. Patient was continued on ceftriaxone and vancomycin and was determined to need long term IV antibiotic treatment requiring a PICC line. Patient was found to have QUANG and his home lisinopril was held and amlodipine was started for blood pressure management. Hydration was encourage and patient BUN/Cr was seen to improve over his hospital course. Cardiology (Dr. Garland) was consulted. Patient's home metoprolol, digoxin, and lipitor were continued, but coumadin was held for PICC line procedure. Interventional Radiology (Dr. Pearce) placed a PICC line to facilitate long term antibiotic treatment. Patient tolerated the procedure well with no adverse effects. Patient's vancomycin trough was found to be 8.8 and per ID recommendations patient's vacomycin dose was increased to 1500 mg daily. Efforts were made to switch patient to a novel anticoagulant, but it was found that prior authorization was necessary to initiate Eliquis and Xarelto proved to be a financial labor for patient. Patient was restarted on his home dose of coumadin and his INR was checked daily. Patient remained hemodynamically stable throughout his hospital course and was seen and examined on the day of discharge.         ----    VITAL SIGNS:    Vital Signs Last 24 Hrs    T(C): 36.8 (08 Aug 2019 05:20), Max: 36.8 (08 Aug 2019 05:20)    T(F): 98.2 (08 Aug 2019 05:20), Max: 98.2 (08 Aug 2019 05:20)    HR: 60 (08 Aug 2019 05:20) (60 - 61)    BP: 148/78 (08 Aug 2019 05:20) (101/60 - 148/78)    RR: 17 (08 Aug 2019 05:20) (17 - 19)    SpO2: 97% (08 Aug 2019 05:20) (94% - 98%)        PHYSICAL EXAM:    GENERAL: patient appears well, no acute distress, appropriately interactive    EYES: sclera clear, no exudates, cataract present in R eye    ENMT: moist mucous membranes    NECK: well-healing skin ca resection site on anterior chest without surrounding erythema, drainage, or purulent discharge    LUNGS: good air entry bilaterally, clear to auscultation, symmetric breath sounds, no wheezing or rhonchi appreciated    HEART: soft S1/S2, regular rate and irregular rhythm, no murmurs noted, no noted edema to b/l LE    GASTROINTESTINAL: abdomen is soft, nontender, nondistended, normoactive bowel sounds, no palpable masses    INTEGUMENT: 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 Dada Reed    Plastic/Hand Surgery Dr. Toth Patient is a 78y old  Male who presents with a chief complaint of finger swelling and pain.             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 History obtained from patient and son at bedside. Patient was opening garage door on Thursday when his right index finger was caught between panels of the door. Initially applied Neosporin with bandage with persitent swelling. Went to  today, advised to present to the ED for evaluation. Denies fevers, chills, headaches, dizziness, numbness, tingling, loss of sensation. UTD with tetanus vaccine.         In the ED: VSS, CBC wnl, INR 5.20, PTT 62.4, BUN 29, Cr 1.60, glucose 177, Alk  phos 123, GFR 41, Xray hand: 1. Significant comminuted/displaced fracture involving 2nd middle phalanx, 2. Also fracture involvement of 2nd distal phalanx. 3. Pathologic component of this fracture must be considered. Given Ancef x1, 1 L ns bolus, tdap. EKG V paced rate 64.             ----    HOSPITAL COURSE: Patient was admitted to general medical floor for management of open, draining fracture of the right index finger with suspected osteomyelitis. Patient received 5mg of IV Vitamin K and repeat INR was 2.76. Hand surgeon (Dr. Toth) was consulted. It was determined there was no acute need for surgical intervention. Prognosis for salvage of the finger was guarded and risks, benefits, and options were discussed with the patient who determined he would like to pursue efforts made to salvage the finger even if the chances were slim. Culture of the wound was sent which was found to have few proteus mirabilis. Infectious disease (Dr. Reed) was consulted. Patient was continued on ceftriaxone and vancomycin and was determined to need long term IV antibiotic treatment requiring a PICC line. Patient was found to have QUANG and his home lisinopril was held and amlodipine was started for blood pressure management. Hydration was encourage and patient BUN/Cr was seen to improve over his hospital course. Cardiology (Dr. Garland) was consulted. Patient's home metoprolol, digoxin, and lipitor were continued, but coumadin was held for PICC line procedure. Interventional Radiology (Dr. Pearce) placed a central line tunneled catheter into R subclavian to facilitate long term antibiotic treatment. Note, he was unable to access via PICC given patients ICD wires and performed tunneled central line catheter instead. Patient tolerated the procedure well with no adverse effects. Patient's vancomycin trough was found to be 8.8 and per ID recommendations patient's vacomycin dose was increased to 1500 mg daily. Efforts were made to switch patient to a novel anticoagulant, but it was found that prior authorization was necessary to initiate Eliquis and Xarelto proved to not be economical. Patient was restarted on his home dose of coumadin and his INR was checked daily. Patient remained hemodynamically stable throughout his hospital course and was seen and examined on the day of discharge.         ----    VITAL SIGNS:    Vital Signs Last 24 Hrs    T(C): 36.9 (08 Aug 2019 12:01), Max: 36.9 (08 Aug 2019 12:01)    T(F): 98.4 (08 Aug 2019 12:01), Max: 98.4 (08 Aug 2019 12:01)    HR: 60 (08 Aug 2019 14:35) (60 - 60)    BP: 124/71 (08 Aug 2019 14:35) (101/60 - 148/78)    RR: 14 (08 Aug 2019 14:35) (14 - 18)    SpO2: 98% (08 Aug 2019 14:35) (94% - 98%)        PHYSICAL EXAM:    GENERAL: patient appears well, no acute distress, appropriately interactive    EYES: sclera clear, no exudates, cataract present in R eye    ENMT: moist mucous membranes    NECK: well-healing skin ca resection site on anterior chest without surrounding erythema, drainage, or purulent discharge    LUNGS: good air entry bilaterally, clear to auscultation, symmetric breath sounds, no wheezing or rhonchi appreciated    HEART: soft S1/S2, regular rate and irregular rhythm, no murmurs noted, no noted edema to b/l LE    GASTROINTESTINAL: abdomen is soft, nontender, nondistended, normoactive bowel sounds, no palpable masses    INTEGUMENT: 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 Dada Reed    Plastic/Hand Surgery Dr. Toth Patient is a 78y old  Male who presents with a chief complaint of finger swelling and pain.             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 History obtained from patient and son at bedside. Patient was opening garage door on Thursday when his right index finger was caught between panels of the door. Initially applied Neosporin with bandage with persitent swelling. Went to  today, advised to present to the ED for evaluation. Denies fevers, chills, headaches, dizziness, numbness, tingling, loss of sensation. UTD with tetanus vaccine.         In the ED: VSS, CBC wnl, INR 5.20, PTT 62.4, BUN 29, Cr 1.60, glucose 177, Alk  phos 123, GFR 41, Xray hand: 1. Significant comminuted/displaced fracture involving 2nd middle phalanx, 2. Also fracture involvement of 2nd distal phalanx. 3. Pathologic component of this fracture must be considered. Given Ancef x1, 1 L ns bolus, tdap. EKG V paced rate 64.             ----    HOSPITAL COURSE: Patient was admitted to general medical floor for management of open, draining fracture of the right index finger with suspected osteomyelitis. Patient received 5mg of IV Vitamin K and repeat INR was 2.76. Hand surgeon (Dr. Toth) was consulted. It was determined there was no acute need for surgical intervention. Prognosis for salvage of the finger was guarded and risks, benefits, and options were discussed with the patient who determined he would like to pursue efforts made to salvage the finger even if the chances were slim. Culture of the wound was sent which was found to have few proteus mirabilis. Infectious disease (Dr. Reed) was consulted. Patient was continued on ceftriaxone and vancomycin and was determined to need long term IV antibiotic treatment requiring a PICC line. Patient was found to have QUANG and his home lisinopril was held and amlodipine was started for blood pressure management. Hydration was encourage and patient BUN/Cr was seen to improve over his hospital course. Cardiology (Dr. Garland) was consulted. Patient's home metoprolol, digoxin, and lipitor were continued, but coumadin was held for PICC line procedure. Interventional Radiology (Dr. Pearce) placed a central line tunneled catheter into R subclavian to facilitate long term antibiotic treatment. Note, he was unable to access via PICC given patients ICD wires and performed tunneled central line catheter instead. Patient tolerated the procedure well with no adverse effects. Patient's vancomycin trough was found to be 8.8 and per ID recommendations patient's vacomycin dose was increased to 1500 mg daily and follow up in 2 weeks with ID Dr Echevarria as outpatient, vanco trough to be drawn prior to 3rd dose. Efforts were made to switch patient to a novel anticoagulant, but it was found that prior authorization was necessary to initiate Eliquis and Xarelto proved to not be economical. Patient was restarted on his home dose of coumadin and his INR was checked daily. Patient remained hemodynamically stable throughout his hospital course and was seen and examined on the day of discharge.         ----    VITAL SIGNS:    Vital Signs Last 24 Hrs    T(C): 36.9 (08 Aug 2019 12:01), Max: 36.9 (08 Aug 2019 12:01)    T(F): 98.4 (08 Aug 2019 12:01), Max: 98.4 (08 Aug 2019 12:01)    HR: 60 (08 Aug 2019 14:35) (60 - 60)    BP: 124/71 (08 Aug 2019 14:35) (101/60 - 148/78)    RR: 14 (08 Aug 2019 14:35) (14 - 18)    SpO2: 98% (08 Aug 2019 14:35) (94% - 98%)        PHYSICAL EXAM:    GENERAL: patient appears well, no acute distress, appropriately interactive    EYES: sclera clear, no exudates, cataract present in R eye    ENMT: moist mucous membranes    NECK: well-healing skin ca resection site on anterior chest without surrounding erythema, drainage, or purulent discharge    LUNGS: good air entry bilaterally, clear to auscultation, symmetric breath sounds, no wheezing or rhonchi appreciated    HEART: soft S1/S2, regular rate and irregular rhythm, no murmurs noted, no noted edema to b/l LE    GASTROINTESTINAL: abdomen is soft, nontender, nondistended, normoactive bowel sounds, no palpable masses    INTEGUMENT: 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 Dada Reed    Plastic/Hand Surgery Dr. Toth        Pt is medically stable for discharge to home with home care and close follow up with outpatient provider for further care and management.         Time spent: 75 minutes Patient is a 78y old  Male who presents with a chief complaint of finger swelling and pain.             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 History obtained from patient and son at bedside. Patient was opening garage door on Thursday when his right index finger was caught between panels of the door. Initially applied Neosporin with bandage with persitent swelling. Went to  today, advised to present to the ED for evaluation. Denies fevers, chills, headaches, dizziness, numbness, tingling, loss of sensation. UTD with tetanus vaccine.         In the ED: VSS, CBC wnl, INR 5.20, PTT 62.4, BUN 29, Cr 1.60, glucose 177, Alk  phos 123, GFR 41, Xray hand: 1. Significant comminuted/displaced fracture involving 2nd middle phalanx, 2. Also fracture involvement of 2nd distal phalanx. 3. Pathologic component of this fracture must be considered. Given Ancef x1, 1 L ns bolus, tdap. EKG V paced rate 64.             ----    HOSPITAL COURSE: Patient was admitted to general medical floor for management of open, draining fracture of the right index finger with suspected osteomyelitis. Patient received 5mg of IV Vitamin K and repeat INR was 2.76. Hand surgeon (Dr. Toth) was consulted. It was determined there was no acute need for surgical intervention. Prognosis for salvage of the finger was guarded and risks, benefits, and options were discussed with the patient who determined he would like to pursue efforts made to salvage the finger even if the chances were slim. Culture of the wound was sent which was found to have few proteus mirabilis. Infectious disease (Dr. Reed) was consulted. Patient was continued on ceftriaxone and vancomycin and was determined to need long term IV antibiotic treatment requiring a PICC line. Patient was found to have QUANG and his home lisinopril was held and amlodipine was started for blood pressure management. Hydration was encourage and patient BUN/Cr was seen to improve over his hospital course. Cardiology (Dr. Garland) was consulted. Patient's home metoprolol, digoxin, and lipitor were continued, but coumadin was held for PICC line procedure. Interventional Radiology (Dr. Pearce) placed a central line tunneled catheter into R subclavian to facilitate long term antibiotic treatment. Note, he was unable to access via PICC given patients ICD wires and performed tunneled central line catheter instead. Patient tolerated the procedure well with no adverse effects. Patient's vancomycin trough was found to be 8.8 and per ID recommendations patient's vacomycin dose was increased to 1500 mg daily and follow up in 2 weeks with ID Dr Echevarria as outpatient, vanco trough to be drawn prior to 3rd dose. Efforts were made to switch patient to a novel anticoagulant, but it was found that prior authorization was necessary to initiate Eliquis and Xarelto proved to not be economical. Patient was restarted on his home dose of coumadin and his INR was checked daily. Patient remained hemodynamically stable throughout his hospital course and was seen and examined on the day of discharge.         ----    VITAL SIGNS:    Vital Signs Last 24 Hrs    T(C): 36.9 (08 Aug 2019 12:01), Max: 36.9 (08 Aug 2019 12:01)    T(F): 98.4 (08 Aug 2019 12:01), Max: 98.4 (08 Aug 2019 12:01)    HR: 60 (08 Aug 2019 14:35) (60 - 60)    BP: 124/71 (08 Aug 2019 14:35) (101/60 - 148/78)    RR: 14 (08 Aug 2019 14:35) (14 - 18)    SpO2: 98% (08 Aug 2019 14:35) (94% - 98%)        PHYSICAL EXAM:    GENERAL: patient appears well, no acute distress, appropriately interactive    EYES: sclera clear, no exudates, cataract present in R eye    ENMT: moist mucous membranes    NECK: well-healing skin ca resection site on anterior chest without surrounding erythema, drainage, or purulent discharge    LUNGS: good air entry bilaterally, clear to auscultation, symmetric breath sounds, no wheezing or rhonchi appreciated    HEART: soft S1/S2, regular rate and irregular rhythm, no murmurs noted, no noted edema to b/l LE    GASTROINTESTINAL: abdomen is soft, nontender, nondistended, normoactive bowel sounds, no palpable masses    INTEGUMENT: 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 Garland    ID Dr. Reed    Plastic/Hand Surgery Dr. Toth        Pt is medically stable for discharge to home with home care and close follow up with outpatient provider for further care and management.         Time spent: 75 minutes

## 2019-08-07 NOTE — DISCHARGE NOTE PROVIDER - NSDCCPCAREPLAN_GEN_ALL_CORE_FT
PRINCIPAL DISCHARGE DIAGNOSIS  Diagnosis: Open fracture of finger of right hand  Assessment and Plan of Treatment: - You were found to have a fracture of your right index finger  - No immediate surgery was recommended  - It was discussed with you that your finger may not be salvagable, but all efforts would be made to salvage  - You were treated with Ceftriaxone and Vancomycin for infection surrounding the finger  - You received a PICC line in order to administer your antibiotics  - You are to continue Ceftriaxone 1000mg daily and Vancomycin 1000mg daily until 9/16/2019  - Care of your wound should be continued including soaking in warm saline and applying bacitracin prior to bandaging      SECONDARY DISCHARGE DIAGNOSES  Diagnosis: Atrial fibrillation  Assessment and Plan of Treatment: - Continue your home medications as prescribed  - Follow up with your PCP/cardiologist in 1 week    Diagnosis: Hyperlipidemia  Assessment and Plan of Treatment: - Continue your home medications as prescribed  - Follow up with your PCP in 1 week    Diagnosis: Diabetes  Assessment and Plan of Treatment: - Continue your home diabetes medications as prescribed  - Follow up with your PCP in 1 week    Diagnosis: QUANG (acute kidney injury)  Assessment and Plan of Treatment: - You were found to have decreased kidney function which improved over your stay in the hospital  - Follow up with your PCP in 1 week    Diagnosis: Supratherapeutic INR  Assessment and Plan of Treatment: - Your INR was found to be 5.20 when you came to the hospital  - You were given 5mg of Vitamin K  - You were restarted on your coumadin before leaving the hospital  - Continue your prescribed dose of coumadin at home  - Follow up with your PCP/cardiologist in 1 week    Diagnosis: Cellulitis of finger of right hand  Assessment and Plan of Treatment: - You were found to have cellulitis in your right index finger  - You are to continue Ceftriaxone and Vancomycin through the PICC line until 9/16/2019  - Follow up with your PCP in 1 week PRINCIPAL DISCHARGE DIAGNOSIS  Diagnosis: Open fracture of finger of right hand  Assessment and Plan of Treatment: - You were found to have a fracture of your right index finger  - No immediate surgery was recommended  - It was discussed with you that your finger may not be salvagable, but all efforts would be made to salvage  - You were treated with Ceftriaxone and Vancomycin for infection surrounding the finger  - You received a PICC line in order to administer your antibiotics  - You are to continue Ceftriaxone 1500 mg daily and Vancomycin 1000mg daily until 9/16/2019  - Care of your wound should be continued including soaking in warm saline and applying bacitracin prior to bandaging      SECONDARY DISCHARGE DIAGNOSES  Diagnosis: Atrial fibrillation  Assessment and Plan of Treatment: - Continue your home medications as prescribed  - Follow up with your PCP/cardiologist in 1 week    Diagnosis: Hyperlipidemia  Assessment and Plan of Treatment: - Continue your home medications as prescribed  - Follow up with your PCP in 1 week    Diagnosis: Diabetes  Assessment and Plan of Treatment: - Continue your home diabetes medications as prescribed  - Follow up with your PCP in 1 week    Diagnosis: QUANG (acute kidney injury)  Assessment and Plan of Treatment: - You were found to have decreased kidney function which improved over your stay in the hospital  - Follow up with your PCP in 1 week    Diagnosis: Supratherapeutic INR  Assessment and Plan of Treatment: - Your INR was found to be 5.20 when you came to the hospital  - You were given 5mg of Vitamin K  - You were restarted on your coumadin before leaving the hospital  - Continue your prescribed dose of coumadin at home  - Follow up with your PCP/cardiologist in 1 week    Diagnosis: Cellulitis of finger of right hand  Assessment and Plan of Treatment: - You were found to have cellulitis in your right index finger  - You are to continue Ceftriaxone and Vancomycin through the PICC line until 9/16/2019  - Follow up with your PCP in 1 week PRINCIPAL DISCHARGE DIAGNOSIS  Diagnosis: Open fracture of finger of right hand  Assessment and Plan of Treatment: - You were found to have a fracture of your right index finger  - No immediate surgery was recommended  - It was discussed with you that your finger may not be salvagable, but all efforts would be made to salvage  - You were treated with Ceftriaxone and Vancomycin for infection surrounding the finger  - You received a central tunnel catheter in order to administer your antibiotics  - You are to continue Ceftriaxone 1500 mg daily and Vancomycin 1000mg daily until 9/16/2019  -You will need a vancomycin trough prior to every 3rd dose of vancomyicn  - Care of your wound should be continued including soaking in warm saline and applying bacitracin prior to bandaging      SECONDARY DISCHARGE DIAGNOSES  Diagnosis: Atrial fibrillation  Assessment and Plan of Treatment: - Continue your home medications as prescribed  - Follow up with your PCP/cardiologist in 1 week    Diagnosis: Hyperlipidemia  Assessment and Plan of Treatment: - Continue your home medications as prescribed  - Follow up with your PCP in 1 week    Diagnosis: Diabetes  Assessment and Plan of Treatment: - Continue your home diabetes medications as prescribed  - Follow up with your PCP in 1 week    Diagnosis: QUANG (acute kidney injury)  Assessment and Plan of Treatment: - You were found to have decreased kidney function which improved over your stay in the hospital  - Follow up with your PCP in 1 week    Diagnosis: Supratherapeutic INR  Assessment and Plan of Treatment: - Your INR was found to be 5.20 when you came to the hospital  - You were given 5mg of Vitamin K  - You were restarted on your coumadin before leaving the hospital  - Continue your prescribed dose of coumadin at home  - Follow up with your PCP/cardiologist in 1 week    Diagnosis: Cellulitis of finger of right hand  Assessment and Plan of Treatment: - You were found to have cellulitis in your right index finger  - You are to continue Ceftriaxone and Vancomycin through the PICC line until 9/16/2019  - Follow up with your PCP in 1 week PRINCIPAL DISCHARGE DIAGNOSIS  Diagnosis: Open fracture of finger of right hand  Assessment and Plan of Treatment: - You were found to have a fracture of your right index finger  - No immediate surgery was recommended  - It was discussed with you that your finger may not be salvagable, but all efforts would be made to salvage  - You were treated with Ceftriaxone and Vancomycin for infection surrounding the finger  - You received a central tunneled central line catheter in order to administer your antibiotics  - You are to continue Ceftriaxone 1500 mg daily and Vancomycin 1000mg daily until 9/16/2019  -You will need a vancomycin trough prior to every 3rd dose of vancomyicn  - Care of your wound should be continued including soaking in warm saline and applying bacitracin prior to bandaging      SECONDARY DISCHARGE DIAGNOSES  Diagnosis: Hypertension  Assessment and Plan of Treatment: -You were diagnosed with hypertension, and because you had kidney problems in the hospital, your blood pressure meds were temporarily changed  -STOP your lisinopril  -Start taking amldoipine 5 mg dailyi  -Start taking Toprol XL 25 mg daily with food  -Follow up with your heart doctor in 1 week  -Follow up with your PCP in 1 week    Diagnosis: CAD (coronary atherosclerotic disease)  Assessment and Plan of Treatment: -You were started on aspirin and given a prescription for aspirin 81 mg daily  -Continue aspirin 81 mg daily  -You were started on Toprol XL 25 mg daily which you should continue daily  -Follow up with your cardiologist within 1 week  - Follow up with your primary care doctor in 1 week    Diagnosis: Atrial fibrillation  Assessment and Plan of Treatment: - Continue your home medications as prescribed  - Follow up with your PCP/cardiologist in 1 week    Diagnosis: Hyperlipidemia  Assessment and Plan of Treatment: - Continue your home medications as prescribed  - Follow up with your PCP in 1 week    Diagnosis: Diabetes  Assessment and Plan of Treatment: - Continue your home diabetes medications as prescribed  - Follow up with your PCP in 1 week    Diagnosis: QUANG (acute kidney injury)  Assessment and Plan of Treatment: - You were found to have decreased kidney function which improved over your stay in the hospital  - DO NOT TAKE YOUR LISINOPRIL until you see your PCP  - You were started on amlodipine 5 mg daily  - Follow up with your PCP in 1 week    Diagnosis: Supratherapeutic INR  Assessment and Plan of Treatment: - Your INR was found to be 5.20 when you came to the hospital  - You were given 5mg of Vitamin K  - You were restarted on your coumadin before leaving the hospital  - Continue your prescribed dose of coumadin at home  - Follow up with your PCP/cardiologist in 1 week    Diagnosis: Cellulitis of finger of right hand  Assessment and Plan of Treatment: - You were found to have cellulitis in your right index finger  - You are to continue Ceftriaxone and Vancomycin through the centrla line until 9/16/2019  - Follow up with your PCP in 1 week  - Follow up with plastic surgeon within 1 week PRINCIPAL DISCHARGE DIAGNOSIS  Diagnosis: Open fracture of finger of right hand  Assessment and Plan of Treatment: - You were found to have a fracture of your right index finger  - No immediate surgery was recommended  - It was discussed with you that your finger may not be salvagable, but all efforts would be made to salvage  - You were treated with Ceftriaxone and Vancomycin for infection surrounding the finger  - You received a central tunneled central line catheter in order to administer your antibiotics  - You are to continue Ceftriaxone 1500 mg daily and Vancomycin 1000mg daily until 9/16/2019  -You will need a vancomycin trough prior to every 3rd dose of vancomyicn  -please follow up with infectious disease specialist Dr. Echevarria within 2 weeks of discharge for further care and management  - Care of your wound should be continued including soaking in warm saline and applying bacitracin prior to bandaging      SECONDARY DISCHARGE DIAGNOSES  Diagnosis: Hypertension  Assessment and Plan of Treatment: -You were diagnosed with hypertension, and because you had kidney problems in the hospital, your blood pressure meds were temporarily changed  -STOP your lisinopril  -Start taking amldoipine 5 mg dailyi  -Start taking Toprol XL 25 mg daily with food  -Follow up with your heart doctor in 1 week  -Follow up with your PCP in 1 week    Diagnosis: CAD (coronary atherosclerotic disease)  Assessment and Plan of Treatment: -You were started on aspirin and given a prescription for aspirin 81 mg daily  -Continue aspirin 81 mg daily  -You were started on Toprol XL 25 mg daily which you should continue daily  -Follow up with your cardiologist within 1 week  - Follow up with your primary care doctor in 1 week    Diagnosis: Atrial fibrillation  Assessment and Plan of Treatment: - Continue your home medications as prescribed  - Follow up with your PCP/cardiologist in 1 week    Diagnosis: Hyperlipidemia  Assessment and Plan of Treatment: - Continue your home medications as prescribed  - Follow up with your PCP in 1 week    Diagnosis: Diabetes  Assessment and Plan of Treatment: - Continue your home diabetes medications as prescribed  - Follow up with your PCP in 1 week    Diagnosis: QUANG (acute kidney injury)  Assessment and Plan of Treatment: - You were found to have decreased kidney function which improved over your stay in the hospital  - DO NOT TAKE YOUR LISINOPRIL until you see your PCP  - You were started on amlodipine 5 mg daily  - Follow up with your PCP in 1 week    Diagnosis: Supratherapeutic INR  Assessment and Plan of Treatment: - Your INR was found to be 5.20 when you came to the hospital  - You were given 5mg of Vitamin K  - You were restarted on your coumadin before leaving the hospital  - Continue your prescribed dose of coumadin at home, please have your INR checked within 2-3 days of discharge   - Follow up with your PCP/cardiologist in 1 week    Diagnosis: Cellulitis of finger of right hand  Assessment and Plan of Treatment: - You were found to have cellulitis in your right index finger  - You are to continue Ceftriaxone and Vancomycin through the centrla line until 9/16/2019  - Follow up with your PCP in 1 week  - Follow up with plastic surgeon within 1 week

## 2019-08-07 NOTE — PROGRESS NOTE ADULT - PROBLEM SELECTOR PLAN 3
Chronic, stable  - Continue Digoxin .125 mg every other day  - Holding AC for now sec to IR procedure tomorrow  - Will restart AC with NOAC after procedure if patient amendable to medication switch  - Cardio Dr. Garland following, recommendations appreciated

## 2019-08-07 NOTE — PROGRESS NOTE ADULT - PROBLEM SELECTOR PLAN 1
Open, displaced fracture of right 2nd digit with high risk of OM  - No acute indication for surgical intervention as finger is draining. Patient aware of high risk of failure to salvage finger, but wishes to continue with salvaging efforts.  - Continue ceftriaxone 1000mg IV daily and vancomycin 1000mg IV daily until Sept 16th, for PICC line with IR Dr. Pearce tomorrow  - F/u vanc trough prior to third dose  - Local wound care per Dr. Toth's orders  - BCx x2 NGTD, Procalcitonin WNL  - F/u wound culture  - F/u AM CBC, ESR, and CRP  - As pt is right-hand dominant, OT consult offered, but patient declined  - ID Dr. Reed following, recommendations appreciated  - Hand surgery Dr. Toth following, recommendations appreciated

## 2019-08-07 NOTE — PROGRESS NOTE ADULT - SUBJECTIVE AND OBJECTIVE BOX
[facility], Division of Infectious Diseases  LIANET Ralph A. Lee  [phone]    Name: NOELLE LOTT  Age: 78y  Gender: Male  MRN: [id number]    Interval History--  Notes reviewed  pt offers not new complaints  states he is waiting for lunch    Past Medical History--  Type 2 diabetes mellitus  H/O squamous cell carcinoma excision  Chronic GERD  Ventricular Arrhythmia  ICD (Implantable Cardiac Defibrillator) in Place  Hyperlipidemia  Ischemic Cardiomyopathy  CAD (Coronary Artery Disease)  CABG (Coronary Artery Bypass Graft)  Atrial Fibrillation  S/P ICD (internal cardiac defibrillator) procedure  S/P placement of cardiac pacemaker  S/P CABG x 4      For details regarding the patient's social history, family history, and other miscellaneous elements, please refer the initial infectious diseases consultation and/or the admitting history and physical examination for this admission.    Allergies    No Known Allergies    Intolerances        Medications--  Antibiotics:  cefTRIAXone   IVPB 1000 milliGRAM(s) IV Intermittent every 24 hours  vancomycin  IVPB 1000 milliGRAM(s) IV Intermittent every 24 hours    Immunologic:    Other:  acetaminophen   Tablet .. PRN  amLODIPine   Tablet  apixaban  atorvastatin  dextrose 40% Gel PRN  dextrose 5%.  dextrose 50% Injectable  dextrose 50% Injectable  dextrose 50% Injectable  digoxin     Tablet  glucagon  Injectable PRN  insulin lispro (HumaLOG) corrective regimen sliding scale  insulin lispro (HumaLOG) corrective regimen sliding scale  pantoprazole    Tablet  saccharomyces boulardii  sodium chloride 0.9%.      Review of Systems--  A 10-point review of systems was obtained.     Pertinent positives and negatives--  Constitutional: No fevers. No Chills. No Rigors.   Cardiovascular: No chest pain. No palpitations.  Respiratory: No shortness of breath. No cough.  Gastrointestinal: No nausea or vomiting. No diarrhea or constipation.   Psychiatric: no depression    Review of systems otherwise negative except as previously noted.    Physical Examination--  Vital Signs: T(F): 97.6 (08-07-19 @ 05:28), Max: 97.7 (08-06-19 @ 20:56)  HR: 64 (08-07-19 @ 05:28)  BP: 124/66 (08-07-19 @ 05:28)  RR: 17 (08-07-19 @ 05:28)  SpO2: 96% (08-07-19 @ 05:28)  Wt(kg): --  General: Nontoxic-appearing Male in no acute distress.  HEENT: AT/NC. Anicteric. Conjunctiva pink and moist. Oropharynx clear. Dentition fair.  Neck: Not rigid. No sense of mass.  Nodes: None palpable.  Lungs: Clear bilaterally without rales, wheezing or rhonchi  Heart: Regular rate and rhythm. No Murmur  Abdomen: Bowel sounds present and normoactive. Soft. Nondistended. Nontender.  Back: No spinal tenderness. No costovertebral angle tenderness.   Extremities: No cyanosis or clubbing. No edema. lue -- wound wrapped no surrounding erythema  Skin: Warm. Dry. Good turgor. No rash. No vasculitic stigmata.  Psychiatric: Appropriate affect and mood for situation.         Laboratory Studies--  CBC                        13.3   6.73  )-----------( 128      ( 07 Aug 2019 08:22 )             40.3       Chemistries  08-07    140  |  107  |  25<H>  ----------------------------<  173<H>  3.9   |  23  |  1.50<H>    Ca    9.7      07 Aug 2019 08:22    TPro  8.3  /  Alb  3.6  /  TBili  1.0  /  DBili  x   /  AST  35  /  ALT  38  /  AlkPhos  123<H>  08-05      Culture Data    Culture - Blood (collected 05 Aug 2019 21:15)  Source: .Blood Blood-Peripheral  Preliminary Report (06 Aug 2019 22:01):    No growth to date.    Culture - Blood (collected 05 Aug 2019 21:15)  Source: .Blood Blood-Peripheral  Preliminary Report (06 Aug 2019 22:01):    No growth to date.        < from: Xray Finger, Right Hand (08.05.19 @ 17:57) >  EXAM:  FINGER(S) RIGHT HAND                            PROCEDURE DATE:  08/05/2019          INTERPRETATION:  DATE OF STUDY: 8/5/2019.    COMPARISON: None.    CLINICAL HISTORY:  78-yo-male - status post right index finger trauma.     Technique: 3 right 2nd finger films.    FINDINGS:  Generalized osteopenia is noted.  There is a comminuted fracture through the neck/head of the 2nd middle   phalanx - with separation and displacement of fragments. There is also   subtle fracture of the base of the second middle phalanx. There is also   involvement of the base of the distal phalanx. Associated generalized   soft tissue swelling seen in the region of the fractures. No tracking of   soft tissue air.  The osteopenic changes in this region raise thepossibility of pathologic   fracture in association with the trauma    IMPRESSION:   1. Significant comminuted/displaced fracture involving 2nd middle   phalanx, as above.  2. Also fracture involvement of 2nd distal phalanx.  3. Pathologic component of this fracture must be considered as above.  This report to ER via PACs system.        < end of copied text >

## 2019-08-07 NOTE — DISCHARGE NOTE PROVIDER - PROVIDER TOKENS
PROVIDER:[TOKEN:[[id number]],FOLLOWUP:[1 week]],PROVIDER:[TOKEN:[[id number]],FOLLOWUP:[1-3 days]] PROVIDER:[TOKEN:[[id number]],FOLLOWUP:[1 week]],PROVIDER:[TOKEN:[[id number]],FOLLOWUP:[1-3 days]],PROVIDER:[TOKEN:[[id number]]] PROVIDER:[TOKEN:[[id number]],FOLLOWUP:[1 week]],PROVIDER:[TOKEN:[[id number]],FOLLOWUP:[1-3 days]],PROVIDER:[TOKEN:[[id number]]],PROVIDER:[TOKEN:[[id number]],FOLLOWUP:[2 weeks]]

## 2019-08-07 NOTE — PROGRESS NOTE ADULT - SUBJECTIVE AND OBJECTIVE BOX
Patient is a 78y old  Male who presents with a chief complaint of finger swelling and pain.      INTERVAL HPI: Patient seen and examined at bedside. Patient sitting comfortably in chair with no current complaints. Denies any pain of his R 2nd digit currently. Denies any n/v/d, chest pain, SOB, abdominal pain, constipation, urinary symptoms, or lower extremity pain/edema.     OVERNIGHT EVENTS: No acute overnight events.    T(C): 36.6 (08-07-19 @ 13:01), Max: 36.6 (08-07-19 @ 13:01)  HR: 61 (08-07-19 @ 13:01) (59 - 64)  BP: 122/68 (08-07-19 @ 13:01) (94/56 - 124/66)  RR: 19 (08-07-19 @ 13:01) (17 - 19)  SpO2: 98% (08-07-19 @ 13:01) (96% - 98%)  I&O's Summary    06 Aug 2019 07:01  -  07 Aug 2019 07:00  --------------------------------------------------------  IN: 1420 mL / OUT: 0 mL / NET: 1420 mL        REVIEW OF SYSTEMS:  CONSTITUTIONAL: No fever or fatigue  EYES: No eye pain, visual disturbances, or discharge  NECK: No pain or stiffness  BREASTS: No pain, masses, or nipple discharge  RESPIRATORY: No cough, wheezing, chills or hemoptysis; No shortness of breath  CARDIOVASCULAR: No chest pain, palpitations, dizziness, or leg swelling  GASTROINTESTINAL: No abdominal or epigastric pain. No nausea, vomiting, or hematemesis; No diarrhea or constipation. No melena or hematochezia.  GENITOURINARY: No dysuria, frequency, hematuria, or incontinence  NEUROLOGICAL: No headaches  SKIN: Admits to warfarin skin necrosis and wound on anterior chest s/p skin ca resection   MUSCULOSKELETAL: No joint pain or swelling; No muscle, back, or extremity pain  PSYCHIATRIC: No difficulty sleeping    PHYSICAL EXAM:  GENERAL: NAD, well-groomed, well-developed  HEAD:  Atraumatic, Normocephalic  EYES: EOMI, cataract present in R eye  ENMT: Moist mucous membranes  NERVOUS SYSTEM:  Alert & Oriented X3, Good concentration  CHEST/LUNG: Clear to auscultation bilaterally; No rales, rhonchi, wheezing, or rubs  HEART: Regular rate and irregular rhythm; No murmurs, rubs, or gallops  ABDOMEN: Soft, Nontender, Nondistended; Bowel sounds present  EXTREMITIES: No lower extremity edema  RIGHT HAND: R 2nd digit with dressing c/d/i  SKIN: 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; Well-healing skin ca resection site on anterior chest without surrounding erythema or drainage    LABS:                        13.3   6.73  )-----------( 128      ( 07 Aug 2019 08:22 )             40.3     08-07    140  |  107  |  25<H>  ----------------------------<  173<H>  3.9   |  23  |  1.50<H>    Ca    9.7      07 Aug 2019 08:22    TPro  8.3  /  Alb  3.6  /  TBili  1.0  /  DBili  x   /  AST  35  /  ALT  38  /  AlkPhos  123<H>  08-05    PT/INR - ( 07 Aug 2019 08:22 )   PT: 17.1 sec;   INR: 1.48 ratio         PTT - ( 05 Aug 2019 18:22 )  PTT:58.3 sec    CAPILLARY BLOOD GLUCOSE      POCT Blood Glucose.: 251 mg/dL (07 Aug 2019 11:57)  POCT Blood Glucose.: 152 mg/dL (07 Aug 2019 08:16)  POCT Blood Glucose.: 104 mg/dL (06 Aug 2019 21:21)  POCT Blood Glucose.: 161 mg/dL (06 Aug 2019 17:20)      08-05 @ 21:15   No growth to date.  --  --          Diet:    RADIOLOGY & ADDITIONAL TESTS:    Imaging Personally Reviewed:  [ ] YES  [ ] NO    Consultant(s) Notes Reviewed:  [ ] YES  [ ] NO    MEDICATIONS  (STANDING):  amLODIPine   Tablet 5 milliGRAM(s) Oral daily  atorvastatin 10 milliGRAM(s) Oral at bedtime  cefTRIAXone   IVPB 1000 milliGRAM(s) IV Intermittent every 24 hours  dextrose 5%. 1000 milliLiter(s) (50 mL/Hr) IV Continuous <Continuous>  dextrose 50% Injectable 12.5 Gram(s) IV Push once  dextrose 50% Injectable 25 Gram(s) IV Push once  dextrose 50% Injectable 25 Gram(s) IV Push once  digoxin     Tablet 0.125 milliGRAM(s) Oral every other day  insulin lispro (HumaLOG) corrective regimen sliding scale   SubCutaneous three times a day before meals  insulin lispro (HumaLOG) corrective regimen sliding scale   SubCutaneous at bedtime  pantoprazole    Tablet 40 milliGRAM(s) Oral before breakfast  saccharomyces boulardii 250 milliGRAM(s) Oral two times a day  sodium chloride 0.9%. 1000 milliLiter(s) (50 mL/Hr) IV Continuous <Continuous>  vancomycin  IVPB 1000 milliGRAM(s) IV Intermittent every 24 hours    MEDICATIONS  (PRN):  acetaminophen   Tablet .. 650 milliGRAM(s) Oral every 6 hours PRN Mild Pain (1 - 3)  dextrose 40% Gel 15 Gram(s) Oral once PRN Blood Glucose LESS THAN 70 milliGRAM(s)/deciliter  glucagon  Injectable 1 milliGRAM(s) IntraMuscular once PRN Glucose LESS THAN 70 milligrams/deciliter      Assessment and Plan: As below    Disposition:    DVT Prophylaxis:  Subcu Heparin [  ]     LMWH [ ]     Coumadin [x ]    Xaeralto [ ]    Eliquis [ ]   Venodyne pumps [ ]    Discussed with Patient [x ]     Family [ ]          [ ]   RN[ ]      [ ]    Advance Directives:      Palliative Care:    Care Discussed with Consultants/Other Providers [ ] YES  [ ] NO    [  ] Teaching Attending Addendum: [  ] Present with Resident      I saw and evaluated the patient. Discussed with Resident,  _____________________ and agree with the resident's findings and plan as documented in the resident's note, with the following revision made as necessary.     Attending Comments:          Time spent:

## 2019-08-07 NOTE — PROGRESS NOTE ADULT - PROBLEM SELECTOR PLAN 4
Chronic, has hx of skin CA s/p resection  - Will f/u with pt's derm Dr. Veronica Galicia for local wound care  - Pt has appointment for f/u with Dr. Galicia as outpatient Chronic, has hx of skin CA s/p resection  - Apply bacitracin to area 2x/day  - Pt has appointment for f/u with Dr. Galicia (derm) as outpatient

## 2019-08-07 NOTE — DISCHARGE NOTE PROVIDER - CARE PROVIDER_API CALL
Kris Jamison)  Internal Medicine  [facility] [place] [address]  Phone: [phone]  Fax: [phone]  Follow Up Time: 1 week    Elvis Toth)  Plastic Surgery  [address] [place] [address], [address]  [place], [place] [id number]  Phone: [phone]  Fax: [phone]  Follow Up Time: 1-3 days Kris Jamison)  Internal Medicine  [facility] [place] [address]  Phone: [phone]  Fax: [phone]  Follow Up Time: 1 week    Elvis Toth)  Plastic Surgery  [address] [place] [address], [address]  [place], [place] [id number]  Phone: [phone]  Fax: [phone]  Follow Up Time: 1-3 days    Fam Arthur)  Internal Medicine  [address] [place] [address]  Phone: [phone]  Fax: [phone]  Follow Up Time: Kris Jamison)  Internal Medicine  [facility] [place] [address]  Phone: [phone]  Fax: [phone]  Follow Up Time: 1 week    Elvis Toth)  Plastic Surgery  [address] [place] [address], [address]  [place], [place] [id number]  Phone: [phone]  Fax: [phone]  Follow Up Time: 1-3 days    Fam Arthur)  Internal Medicine  [address] [place] [address]  Phone: [phone]  Fax: [phone]  Follow Up Time:     Joanne Echevarria)  Infectious Disease; Internal Medicine  [address] [facility], [address]  [place] [address]  Phone: [phone]  Fax: [phone]  Follow Up Time: 2 weeks

## 2019-08-07 NOTE — PROGRESS NOTE ADULT - ASSESSMENT
79yo M, w/ PMH/o a.fib. (currently rate-controlled, on digoxin and coumadin), insulin-dependent T2DM, MI, CAD, ischemic cardiomyopathy s/p CABG in 2000, CHF s/p ICD/PPM placement (last echo 4/17 EF 27%; St. Aj device, last interrogation unknown), HLD, skin CA s/p resection, presenting with R index finger pain and swelling with fracture after injury on 8/1, admitted for r/o osteomyelitis.    Right Index Finger Fx  - Cardio initially consulted for cardiac clearance for possible surgical intervention. Plastic Surgery was consulted, determined no definite indication for acute surgical intervention, outpatient f/u recommended.  - Pain control  - ID following  - Long term Abx per ID.  Will need PICC line    CAD s/p CABG  - Resume ASA  - Start BB  - Resume ACEI when BP and renal function tolerarate  - Continue statin    Atrial Fibrillation  - Known h/o atrial fibrillation.  EKG shows ventricular-paced rhythm, no acute changes on EKG compared to previous.  - Echo in 4/17 showed severe LV dysfunction, EF 27%, LAE, moderate MR, and akinetic mid-distal septum, apex, and anterior and inferolateral walls.  s/p ICD/PPM   - Was taking Coumadin at home.  Primary planning to switch to NOAC  - He is not on any AVN blocker.  He is paced on tele  - Monitor and replete lytes, keep K>4, Mg>2.    HFrEF s/p ICD  - He has a known severe LVSD, EF 27%.  - No meaningful evidence of volume overload on exam.  - Unsure why he is not on BB.  I would recommend to start him on Toprol XL 25 mg for now.  He does not follow a cardiologist  - ACEI on hold due to QUANG  - Continue Digoxin    HLD  - Continue statin    DM2  - Care per Primary    Possible D/C in am post PICC line insertion  - All other workup per primary team.  - Will continue to follow with you.    Bridgett Howard [facility]  Cardiology   Spectra #[phone]

## 2019-08-07 NOTE — PROGRESS NOTE ADULT - PROBLEM SELECTOR PLAN 5
Admission INR 5.20, now 1.48 s/p 5mg IV Vit K  - Holding coumadin sec to IR procedure tomorrow  - Will consider restarting AC with a NOAC for AFib if pt agreeable to medication change

## 2019-08-08 ENCOUNTER — TRANSCRIPTION ENCOUNTER (OUTPATIENT)
Age: 79
End: 2019-08-08

## 2019-08-08 VITALS
OXYGEN SATURATION: 98 % | RESPIRATION RATE: 14 BRPM | SYSTOLIC BLOOD PRESSURE: 124 MMHG | HEART RATE: 60 BPM | DIASTOLIC BLOOD PRESSURE: 71 MMHG

## 2019-08-08 LAB
-  AMIKACIN: SIGNIFICANT CHANGE UP
-  AMOXICILLIN/CLAVULANIC ACID: SIGNIFICANT CHANGE UP
-  AMPICILLIN/SULBACTAM: SIGNIFICANT CHANGE UP
-  AMPICILLIN: SIGNIFICANT CHANGE UP
-  AZTREONAM: SIGNIFICANT CHANGE UP
-  CEFAZOLIN: SIGNIFICANT CHANGE UP
-  CEFEPIME: SIGNIFICANT CHANGE UP
-  CEFOXITIN: SIGNIFICANT CHANGE UP
-  CEFTRIAXONE: SIGNIFICANT CHANGE UP
-  CIPROFLOXACIN: SIGNIFICANT CHANGE UP
-  ERTAPENEM: SIGNIFICANT CHANGE UP
-  GENTAMICIN: SIGNIFICANT CHANGE UP
-  LEVOFLOXACIN: SIGNIFICANT CHANGE UP
-  MEROPENEM: SIGNIFICANT CHANGE UP
-  PIPERACILLIN/TAZOBACTAM: SIGNIFICANT CHANGE UP
-  TOBRAMYCIN: SIGNIFICANT CHANGE UP
-  TRIMETHOPRIM/SULFAMETHOXAZOLE: SIGNIFICANT CHANGE UP
ANION GAP SERPL CALC-SCNC: 10 MMOL/L — SIGNIFICANT CHANGE UP (ref 5–17)
BASOPHILS # BLD AUTO: 0.06 K/UL — SIGNIFICANT CHANGE UP (ref 0–0.2)
BASOPHILS NFR BLD AUTO: 1 % — SIGNIFICANT CHANGE UP (ref 0–2)
BUN SERPL-MCNC: 23 MG/DL — SIGNIFICANT CHANGE UP (ref 7–23)
CALCIUM SERPL-MCNC: 9.2 MG/DL — SIGNIFICANT CHANGE UP (ref 8.5–10.1)
CHLORIDE SERPL-SCNC: 107 MMOL/L — SIGNIFICANT CHANGE UP (ref 96–108)
CO2 SERPL-SCNC: 23 MMOL/L — SIGNIFICANT CHANGE UP (ref 22–31)
CREAT SERPL-MCNC: 1.4 MG/DL — HIGH (ref 0.5–1.3)
CRP SERPL-MCNC: 0.23 MG/DL — SIGNIFICANT CHANGE UP (ref 0–0.4)
CULTURE RESULTS: SIGNIFICANT CHANGE UP
EOSINOPHIL # BLD AUTO: 0.38 K/UL — SIGNIFICANT CHANGE UP (ref 0–0.5)
EOSINOPHIL NFR BLD AUTO: 6.5 % — HIGH (ref 0–6)
ERYTHROCYTE [SEDIMENTATION RATE] IN BLOOD: 20 MM/HR — SIGNIFICANT CHANGE UP (ref 0–20)
GLUCOSE SERPL-MCNC: 247 MG/DL — HIGH (ref 70–99)
HCT VFR BLD CALC: 40.3 % — SIGNIFICANT CHANGE UP (ref 39–50)
HGB BLD-MCNC: 13.2 G/DL — SIGNIFICANT CHANGE UP (ref 13–17)
IMM GRANULOCYTES NFR BLD AUTO: 0.5 % — SIGNIFICANT CHANGE UP (ref 0–1.5)
LYMPHOCYTES # BLD AUTO: 1.17 K/UL — SIGNIFICANT CHANGE UP (ref 1–3.3)
LYMPHOCYTES # BLD AUTO: 19.9 % — SIGNIFICANT CHANGE UP (ref 13–44)
MCHC RBC-ENTMCNC: 30.8 PG — SIGNIFICANT CHANGE UP (ref 27–34)
MCHC RBC-ENTMCNC: 32.8 GM/DL — SIGNIFICANT CHANGE UP (ref 32–36)
MCV RBC AUTO: 93.9 FL — SIGNIFICANT CHANGE UP (ref 80–100)
METHOD TYPE: SIGNIFICANT CHANGE UP
MONOCYTES # BLD AUTO: 0.59 K/UL — SIGNIFICANT CHANGE UP (ref 0–0.9)
MONOCYTES NFR BLD AUTO: 10 % — SIGNIFICANT CHANGE UP (ref 2–14)
NEUTROPHILS # BLD AUTO: 3.66 K/UL — SIGNIFICANT CHANGE UP (ref 1.8–7.4)
NEUTROPHILS NFR BLD AUTO: 62.1 % — SIGNIFICANT CHANGE UP (ref 43–77)
NRBC # BLD: 0 /100 WBCS — SIGNIFICANT CHANGE UP (ref 0–0)
ORGANISM # SPEC MICROSCOPIC CNT: SIGNIFICANT CHANGE UP
ORGANISM # SPEC MICROSCOPIC CNT: SIGNIFICANT CHANGE UP
PLATELET # BLD AUTO: 128 K/UL — LOW (ref 150–400)
POTASSIUM SERPL-MCNC: 4 MMOL/L — SIGNIFICANT CHANGE UP (ref 3.5–5.3)
POTASSIUM SERPL-SCNC: 4 MMOL/L — SIGNIFICANT CHANGE UP (ref 3.5–5.3)
RBC # BLD: 4.29 M/UL — SIGNIFICANT CHANGE UP (ref 4.2–5.8)
RBC # FLD: 13.2 % — SIGNIFICANT CHANGE UP (ref 10.3–14.5)
SODIUM SERPL-SCNC: 140 MMOL/L — SIGNIFICANT CHANGE UP (ref 135–145)
SPECIMEN SOURCE: SIGNIFICANT CHANGE UP
WBC # BLD: 5.89 K/UL — SIGNIFICANT CHANGE UP (ref 3.8–10.5)
WBC # FLD AUTO: 5.89 K/UL — SIGNIFICANT CHANGE UP (ref 3.8–10.5)

## 2019-08-08 PROCEDURE — 96361 HYDRATE IV INFUSION ADD-ON: CPT

## 2019-08-08 PROCEDURE — 99285 EMERGENCY DEPT VISIT HI MDM: CPT | Mod: 25

## 2019-08-08 PROCEDURE — 86140 C-REACTIVE PROTEIN: CPT

## 2019-08-08 PROCEDURE — 77001 FLUOROGUIDE FOR VEIN DEVICE: CPT

## 2019-08-08 PROCEDURE — 76937 US GUIDE VASCULAR ACCESS: CPT

## 2019-08-08 PROCEDURE — 83605 ASSAY OF LACTIC ACID: CPT

## 2019-08-08 PROCEDURE — 36415 COLL VENOUS BLD VENIPUNCTURE: CPT

## 2019-08-08 PROCEDURE — 85027 COMPLETE CBC AUTOMATED: CPT

## 2019-08-08 PROCEDURE — 36558 INSERT TUNNELED CV CATH: CPT

## 2019-08-08 PROCEDURE — C1751: CPT

## 2019-08-08 PROCEDURE — 87070 CULTURE OTHR SPECIMN AEROBIC: CPT

## 2019-08-08 PROCEDURE — 85730 THROMBOPLASTIN TIME PARTIAL: CPT

## 2019-08-08 PROCEDURE — 84145 PROCALCITONIN (PCT): CPT

## 2019-08-08 PROCEDURE — C1894: CPT

## 2019-08-08 PROCEDURE — 85610 PROTHROMBIN TIME: CPT

## 2019-08-08 PROCEDURE — 87186 SC STD MICRODIL/AGAR DIL: CPT

## 2019-08-08 PROCEDURE — 77001 FLUOROGUIDE FOR VEIN DEVICE: CPT | Mod: 26

## 2019-08-08 PROCEDURE — 96374 THER/PROPH/DIAG INJ IV PUSH: CPT

## 2019-08-08 PROCEDURE — 80202 ASSAY OF VANCOMYCIN: CPT

## 2019-08-08 PROCEDURE — 93005 ELECTROCARDIOGRAM TRACING: CPT

## 2019-08-08 PROCEDURE — 82962 GLUCOSE BLOOD TEST: CPT

## 2019-08-08 PROCEDURE — 99239 HOSP IP/OBS DSCHRG MGMT >30: CPT

## 2019-08-08 PROCEDURE — 87040 BLOOD CULTURE FOR BACTERIA: CPT

## 2019-08-08 PROCEDURE — 90715 TDAP VACCINE 7 YRS/> IM: CPT

## 2019-08-08 PROCEDURE — 80048 BASIC METABOLIC PNL TOTAL CA: CPT

## 2019-08-08 PROCEDURE — 80053 COMPREHEN METABOLIC PANEL: CPT

## 2019-08-08 PROCEDURE — 85652 RBC SED RATE AUTOMATED: CPT

## 2019-08-08 PROCEDURE — 83036 HEMOGLOBIN GLYCOSYLATED A1C: CPT

## 2019-08-08 PROCEDURE — 90471 IMMUNIZATION ADMIN: CPT

## 2019-08-08 PROCEDURE — 99232 SBSQ HOSP IP/OBS MODERATE 35: CPT

## 2019-08-08 PROCEDURE — 73140 X-RAY EXAM OF FINGER(S): CPT

## 2019-08-08 PROCEDURE — 76937 US GUIDE VASCULAR ACCESS: CPT | Mod: 26

## 2019-08-08 PROCEDURE — C1788: CPT

## 2019-08-08 RX ORDER — CEFTRIAXONE 500 MG/1
2 INJECTION, POWDER, FOR SOLUTION INTRAMUSCULAR; INTRAVENOUS
Qty: 2 | Refills: 0
Start: 2019-08-08 | End: 2019-09-16

## 2019-08-08 RX ORDER — METOPROLOL TARTRATE 50 MG
1 TABLET ORAL
Qty: 30 | Refills: 0
Start: 2019-08-08 | End: 2019-09-06

## 2019-08-08 RX ORDER — VANCOMYCIN HCL 1 G
1500 VIAL (EA) INTRAVENOUS EVERY 24 HOURS
Refills: 0 | Status: DISCONTINUED | OUTPATIENT
Start: 2019-08-08 | End: 2019-08-08

## 2019-08-08 RX ORDER — VANCOMYCIN HCL 1 G
1.5 VIAL (EA) INTRAVENOUS
Qty: 1.5 | Refills: 0
Start: 2019-08-08 | End: 2019-09-18

## 2019-08-08 RX ORDER — SODIUM CHLORIDE 9 MG/ML
10 INJECTION INTRAMUSCULAR; INTRAVENOUS; SUBCUTANEOUS
Refills: 0 | Status: DISCONTINUED | OUTPATIENT
Start: 2019-08-08 | End: 2019-08-08

## 2019-08-08 RX ORDER — DAPAGLIFLOZIN 10 MG/1
1 TABLET, FILM COATED ORAL
Qty: 0 | Refills: 0 | DISCHARGE

## 2019-08-08 RX ORDER — AMLODIPINE BESYLATE 2.5 MG/1
1 TABLET ORAL
Qty: 30 | Refills: 0
Start: 2019-08-08 | End: 2019-09-06

## 2019-08-08 RX ORDER — CHLORHEXIDINE GLUCONATE 213 G/1000ML
1 SOLUTION TOPICAL
Refills: 0 | Status: DISCONTINUED | OUTPATIENT
Start: 2019-08-08 | End: 2019-08-08

## 2019-08-08 RX ORDER — LISINOPRIL 2.5 MG/1
1 TABLET ORAL
Qty: 0 | Refills: 0 | DISCHARGE

## 2019-08-08 RX ORDER — VANCOMYCIN HCL 1 G
1.5 VIAL (EA) INTRAVENOUS
Qty: 0 | Refills: 0 | DISCHARGE
Start: 2019-08-08

## 2019-08-08 RX ORDER — ASPIRIN/CALCIUM CARB/MAGNESIUM 324 MG
1 TABLET ORAL
Qty: 30 | Refills: 0
Start: 2019-08-08 | End: 2019-09-06

## 2019-08-08 RX ADMIN — Medication 1: at 18:04

## 2019-08-08 RX ADMIN — Medication 250 MILLIGRAM(S): at 05:25

## 2019-08-08 RX ADMIN — Medication 2: at 12:03

## 2019-08-08 RX ADMIN — Medication 300 MILLIGRAM(S): at 17:43

## 2019-08-08 RX ADMIN — Medication 25 MILLIGRAM(S): at 05:25

## 2019-08-08 RX ADMIN — PANTOPRAZOLE SODIUM 40 MILLIGRAM(S): 20 TABLET, DELAYED RELEASE ORAL at 05:25

## 2019-08-08 RX ADMIN — Medication 1 APPLICATION(S): at 05:25

## 2019-08-08 RX ADMIN — Medication 250 MILLIGRAM(S): at 17:02

## 2019-08-08 RX ADMIN — CHLORHEXIDINE GLUCONATE 1 APPLICATION(S): 213 SOLUTION TOPICAL at 16:00

## 2019-08-08 RX ADMIN — CEFTRIAXONE 100 MILLIGRAM(S): 500 INJECTION, POWDER, FOR SOLUTION INTRAMUSCULAR; INTRAVENOUS at 15:59

## 2019-08-08 RX ADMIN — AMLODIPINE BESYLATE 5 MILLIGRAM(S): 2.5 TABLET ORAL at 05:24

## 2019-08-08 RX ADMIN — Medication 2: at 08:27

## 2019-08-08 RX ADMIN — Medication 0.12 MILLIGRAM(S): at 12:03

## 2019-08-08 RX ADMIN — Medication 1 APPLICATION(S): at 17:02

## 2019-08-08 NOTE — PROGRESS NOTE ADULT - PROBLEM SELECTOR PLAN 5
Admission INR 5.20, now 1.48 s/p 5mg IV Vit K  - Will restart coumadin after PICC procedure, consider NOAC if pt agreeable to medication change/cost Admission INR 5.20, now 1.48 s/p 5mg IV Vit K  - Will restart coumadin after tunnel catheter procedure, NOAC determined to not be cost effective for patient

## 2019-08-08 NOTE — PROGRESS NOTE ADULT - PROBLEM SELECTOR PLAN 7
Diabetes type II (not on home insulin)  - Insulin Corrective Scale  - Finger sticks per routine  - Consistent Carb Diet  - Hemoglobin A1c = 8.3  - Hypoglycemia protocol
Avoid nephrotoxic meds  Monitor renal indices.   Consider renal consult if renal function worsens
Diabetes type II (not on home insulin)  - Hold home Farxiga, Insulin Corrective Scale  - Finger sticks per routine  - Consistent Carb Diet  - Hemoglobin A1c = 8.3  - Hypoglycemia protocol

## 2019-08-08 NOTE — PROGRESS NOTE ADULT - SUBJECTIVE AND OBJECTIVE BOX
[facility], Division of Infectious Diseases  LIANET Ralph A. Lee  [phone]    Name: NOELLE LOTT  Age: 78y  Gender: Male  MRN: [id number]    Interval History--  Notes reviewed  pt states he is ready for discharge    Past Medical History--  Type 2 diabetes mellitus  H/O squamous cell carcinoma excision  Chronic GERD  Ventricular Arrhythmia  ICD (Implantable Cardiac Defibrillator) in Place  Hyperlipidemia  Ischemic Cardiomyopathy  CAD (Coronary Artery Disease)  CABG (Coronary Artery Bypass Graft)  Atrial Fibrillation  S/P ICD (internal cardiac defibrillator) procedure  S/P placement of cardiac pacemaker  S/P CABG x 4      For details regarding the patient's social history, family history, and other miscellaneous elements, please refer the initial infectious diseases consultation and/or the admitting history and physical examination for this admission.    Allergies    No Known Allergies    Intolerances        Medications--  Antibiotics:  cefTRIAXone   IVPB 1000 milliGRAM(s) IV Intermittent every 24 hours  vancomycin  IVPB 1000 milliGRAM(s) IV Intermittent every 24 hours    Immunologic:    Other:  acetaminophen   Tablet .. PRN  amLODIPine   Tablet  atorvastatin  BACItracin   Ointment  dextrose 40% Gel PRN  dextrose 5%.  dextrose 50% Injectable  dextrose 50% Injectable  dextrose 50% Injectable  digoxin     Tablet  glucagon  Injectable PRN  insulin lispro (HumaLOG) corrective regimen sliding scale  insulin lispro (HumaLOG) corrective regimen sliding scale  metoprolol succinate ER  pantoprazole    Tablet  saccharomyces boulardii  sodium chloride 0.9%.      Review of Systems--  A 10-point review of systems was obtained.     Pertinent positives and negatives--  Constitutional: No fevers. No Chills. No Rigors.   Cardiovascular: No chest pain. No palpitations.  Respiratory: No shortness of breath. No cough.  Gastrointestinal: No nausea or vomiting. No diarrhea or constipation.   Psychiatric: no depression    Review of systems otherwise negative except as previously noted.    Physical Examination--  Vital Signs: T(F): 98.2 (08-08-19 @ 05:20), Max: 98.2 (08-08-19 @ 05:20)  HR: 60 (08-08-19 @ 05:20)  BP: 148/78 (08-08-19 @ 05:20)  RR: 17 (08-08-19 @ 05:20)  SpO2: 97% (08-08-19 @ 05:20)  Wt(kg): --  General: Nontoxic-appearing Male in no acute distress.  HEENT: AT/NC.  Anicteric. Conjunctiva pink and moist. Oropharynx clear. Dentition fair.  Neck: Not rigid. No sense of mass.  Nodes: None palpable.  Lungs: Clear bilaterally without rales, wheezing or rhonchi  Heart: Regular rate and rhythm. No Murmur. No rub. No gallop. No palpable thrill.  Abdomen: Bowel sounds present and normoactive. Soft. Nondistended. Nontender.   Back: No spinal tenderness. No costovertebral angle tenderness.   Extremities: No cyanosis or clubbing. No edema.   Skin: Warm. Dry. Good turgor. No rash. No vasculitic stigmata.  Psychiatric: Appropriate affect and mood for situation.         Laboratory Studies--  CBC                        13.2   5.89  )-----------( 128      ( 08 Aug 2019 08:40 )             40.3       Chemistries  08-08    140  |  107  |  23  ----------------------------<  247<H>  4.0   |  23  |  1.40<H>    Ca    9.2      08 Aug 2019 08:40        Culture Data    Culture - Other (collected 06 Aug 2019 08:32)  Source: .Other right 2nd finger  Preliminary Report (07 Aug 2019 15:31):    Few Proteus mirabilis    Culture - Blood (collected 05 Aug 2019 21:15)  Source: .Blood Blood-Peripheral  Preliminary Report (06 Aug 2019 22:01):    No growth to date.    Culture - Blood (collected 05 Aug 2019 21:15)  Source: .Blood Blood-Peripheral  Preliminary Report (06 Aug 2019 22:01):    No growth to date.    Vancomycin Level, Trough (08.07.19 @ 22:01)    Vancomycin Level, Trough: 8.8: Vancomycin trough levels should be rapidly reached and maintained at  15-20 ug/ml for life threatening MRSA  infections such as sepsis, endocarditis, osteomyelitis and pneumonia. A  first trough level should be drawn  before the 3rd or 4th dose.  Risk of renal toxicity is increased for levels >15 ug/ml, in patients on  other nephrotoxic drugs, who are  hemodynamically unstable, have unstable renal function, or are on  Vancomycin therapy for >14 days. Renal function with  creatinine levels should be monitored for those patients. ug/mL

## 2019-08-08 NOTE — PROGRESS NOTE ADULT - PROBLEM SELECTOR PROBLEM 2
Ischemic Cardiomyopathy
Ischemic Cardiomyopathy
Open displaced fracture of phalanx of right ring finger, unspecified phalanx, sequela
Open displaced fracture of phalanx of right ring finger, unspecified phalanx, sequela
Ischemic Cardiomyopathy

## 2019-08-08 NOTE — PROGRESS NOTE ADULT - PROBLEM SELECTOR PLAN 2
Chronic, stable  - Consider restarting ASA 81mg after IR procedure tomorrow  - Hold lisinopril secondary to QUANG   - Continue Norvasc 5 mg for BP control now, with hold parameters  - Cardio Dr. Garland following, recommendations appreciated
Chronic, stable  Continue ASA 81mg  hold lisinopril sec to omi   will give norvasc 5 mg for bp control for now
with open fracture high risk for underlying osteo and loss of finger without abx.  Vanco/ceftriaxone and if pt tolerates this and improves will look at possible 6 weeks of IV til sept 16  weekly cbc, cmp, vanc trough, sed rate and crp  fax to [phone]  adjust antibx vanc 1500 mg daily
with open fracture high risk for underlying osteo and loss of finger without abx. No water exposure so no neeed for pseudomonas coverage. Have changed abx to Vanco/ceftriaxone and if pt tolerates this and improves will look at possible 6 weeks of IV til sept 16  weekly cbc, cmp, vanc trough, sed rate and crp  fax to [phone]
Chronic, stable  - Hold lisinopril secondary to QUANG   - Continue Norvasc 5 mg and Metoprolol succ 25mg daily, for BP control now, with hold parameters  - Cardio Dr. Garland following, recommendations appreciated

## 2019-08-08 NOTE — PROGRESS NOTE ADULT - PROBLEM SELECTOR PROBLEM 1
Cellulitis of finger of right hand
Cellulitis of finger of right hand
Open fracture of finger of right hand

## 2019-08-08 NOTE — PROGRESS NOTE ADULT - PROBLEM SELECTOR PLAN 8
Improving BUN/Cr 25/1.5 -> 23/1.4  - Encourage PO hydration  - Avoid nephrotoxic meds  - Continue to monitor renal function  - Will consider renal consult if renal function worsens
IMPROVE VTE Individual Risk Assessment          RISK                                                          Points  [  ] Previous VTE                                                3  [  ] Thrombophilia                                             2  [  ] Lower limb paralysis                                   2        (unable to hold up >15 seconds)    [  ] Current Cancer                                             2         (within 6 months)  [  ] Immobilization > 24 hrs                              1  [  ] ICU/CCU stay > 24 hours                             1  [  ] Age > 60                                                         1    IMPROVE VTE Score: 1  SCD's, encourage ambulation  on coumadin
Improving BUN/Cr 29/1.6 -> 25/1.5  - Encourage PO hydration  - Avoid nephrotoxic meds  - Continue to monitor renal function  - Will consider renal consult if renal function worsens

## 2019-08-08 NOTE — PROGRESS NOTE ADULT - SUBJECTIVE AND OBJECTIVE BOX
[facility] -- Yarely Morrison, Frances, Regla, Jerson Hawley Savella  Office # [phone]      Follow Up:  Afib on coumadin    Subjective/Observations:     No events overnight resting comfortably in bed.  No complaints of chest pain, dyspnea, or palpitations reported. No signs of orthopnea or PND.      REVIEW OF SYSTEMS: All other review of systems is negative unless indicated above    PAST MEDICAL & SURGICAL HISTORY:  Type 2 diabetes mellitus  H/O squamous cell carcinoma excision  Chronic GERD  Ventricular Arrhythmia  ICD (Implantable Cardiac Defibrillator) in Place  Hyperlipidemia  Ischemic Cardiomyopathy  CAD (Coronary Artery Disease)  CABG (Coronary Artery Bypass Graft)  Atrial Fibrillation  S/P ICD (internal cardiac defibrillator) procedure  S/P placement of cardiac pacemaker  S/P CABG x 4: 2/13/00      MEDICATIONS  (STANDING):  amLODIPine   Tablet 5 milliGRAM(s) Oral daily  atorvastatin 10 milliGRAM(s) Oral at bedtime  BACItracin   Ointment 1 Application(s) Topical two times a day  cefTRIAXone   IVPB 1000 milliGRAM(s) IV Intermittent every 24 hours  dextrose 5%. 1000 milliLiter(s) (50 mL/Hr) IV Continuous <Continuous>  dextrose 50% Injectable 12.5 Gram(s) IV Push once  dextrose 50% Injectable 25 Gram(s) IV Push once  dextrose 50% Injectable 25 Gram(s) IV Push once  digoxin     Tablet 0.125 milliGRAM(s) Oral every other day  insulin lispro (HumaLOG) corrective regimen sliding scale   SubCutaneous three times a day before meals  insulin lispro (HumaLOG) corrective regimen sliding scale   SubCutaneous at bedtime  metoprolol succinate ER 25 milliGRAM(s) Oral daily  pantoprazole    Tablet 40 milliGRAM(s) Oral before breakfast  saccharomyces boulardii 250 milliGRAM(s) Oral two times a day  sodium chloride 0.9%. 1000 milliLiter(s) (50 mL/Hr) IV Continuous <Continuous>  vancomycin  IVPB 1000 milliGRAM(s) IV Intermittent every 24 hours    MEDICATIONS  (PRN):  acetaminophen   Tablet .. 650 milliGRAM(s) Oral every 6 hours PRN Mild Pain (1 - 3)  dextrose 40% Gel 15 Gram(s) Oral once PRN Blood Glucose LESS THAN 70 milliGRAM(s)/deciliter  glucagon  Injectable 1 milliGRAM(s) IntraMuscular once PRN Glucose LESS THAN 70 milligrams/deciliter      Allergies    No Known Allergies    Intolerances        Vital Signs Last 24 Hrs  T(C): 36.8 (08 Aug 2019 05:20), Max: 36.8 (08 Aug 2019 05:20)  T(F): 98.2 (08 Aug 2019 05:20), Max: 98.2 (08 Aug 2019 05:20)  HR: 60 (08 Aug 2019 05:20) (60 - 61)  BP: 148/78 (08 Aug 2019 05:20) (101/60 - 148/78)  BP(mean): --  RR: 17 (08 Aug 2019 05:20) (17 - 19)  SpO2: 97% (08 Aug 2019 05:20) (94% - 98%)    I&O's Summary    07 Aug 2019 07:01  -  08 Aug 2019 07:00  --------------------------------------------------------  IN: 520 mL / OUT: 0 mL / NET: 520 mL          PHYSICAL EXAM:  TELE: not on tele   Constitutional: NAD, awake and alert, well-developed  HEENT: Moist Mucous Membranes, Anicteric  Pulmonary: Non-labored, breath sounds are clear bilaterally, No wheezing, crackles or rhonchi  Cardiovascular: Regular, S1 and S2 nl, No murmurs, rubs, gallops or clicks  Gastrointestinal: Bowel Sounds present, soft, nontender.   Lymph: No lymphadenopathy. No peripheral edema.  Skin: RIGHT finger with dressing c/d/i, ACW with erythema recent MOHS procedure outpatient   Psych:  Mood & affect appropriate    LABS: All Labs Reviewed:                        13.3   6.73  )-----------( 128      ( 07 Aug 2019 08:22 )             40.3                         12.8   5.94  )-----------( 120      ( 06 Aug 2019 07:42 )             38.5                         14.7   5.91  )-----------( 160      ( 05 Aug 2019 18:22 )             45.4     07 Aug 2019 08:22    140    |  107    |  25     ----------------------------<  173    3.9     |  23     |  1.50   05 Aug 2019 18:22    140    |  104    |  29     ----------------------------<  177    4.3     |  28     |  1.60     Ca    9.7        07 Aug 2019 08:22  Ca    9.7        05 Aug 2019 18:22    TPro  8.3    /  Alb  3.6    /  TBili  1.0    /  DBili  x      /  AST  35     /  ALT  38     /  AlkPhos  123    05 Aug 2019 18:22    PT/INR - ( 07 Aug 2019 08:22 )   PT: 17.1 sec;   INR: 1.48 ratio                  ECG:  < from: 12 Lead ECG (08.05.19 @ 19:00) >    Ventricular Rate 64 BPM    Atrial Rate 69 BPM    QRS Duration 166 ms    Q-T Interval 536 ms    QTC Calculation(Bezet) 552 ms    R Axis 5 degrees    T Axis 107 degrees    Diagnosis Line Ventricular-paced rhythm with frequent premature ventricular complexes  Abnormal ECG  No previous ECGs available    < end of copied text >

## 2019-08-08 NOTE — DISCHARGE NOTE NURSING/CASE MANAGEMENT/SOCIAL WORK - NSDCDPATPORTLINK_GEN_ALL_CORE
You can access the [facility], offered by [facility], by registering with the following website: [email or web address]

## 2019-08-08 NOTE — PROGRESS NOTE ADULT - PROBLEM SELECTOR PLAN 1
Open, displaced fracture of right 2nd digit with high risk of OM  - No acute indication for surgical intervention as finger is draining. Patient aware of high risk of failure to salvage finger, but wishes to continue with salvaging efforts.  - Continue ceftriaxone 1000mg IV daily and vancomycin 1500mg IV daily until Sept 16th, PICC line with IR today  - F/u vanc trough prior to third doses  - Local wound care per Dr. Toth's orders  - BCx x2 NGTD, Procalcitonin WNL  - Wound culture growing proteus mirabilis  - F/u AM CBC, ESR, and CRP  - As pt is right-hand dominant, OT consult offered, but patient declined  - ID Dr. Reed following, recommendations appreciated  - Hand surgery Dr. Toth following, recommendations appreciated Open, displaced fracture of right 2nd digit with high risk of OM  - No acute indication for surgical intervention as finger is draining. Patient aware of high risk of failure to salvage finger, but wishes to continue with salvaging efforts.  - Continue ceftriaxone 1000mg IV daily and vancomycin 1500mg IV daily until Sept 16th, tunnel catheter for abx outpatient placed by IR today  - Continue vanc trough prior to third doses  - Local wound care per Dr. Toth's orders  - BCx x2 NGTD, Procalcitonin WNL  - Wound culture growing proteus mirabilis  - As pt is right-hand dominant, OT consult offered, but patient declined  - ID Dr. Reed following, recommendations appreciated  - Hand surgery Dr. Toth following, recommendations appreciated

## 2019-08-08 NOTE — PROGRESS NOTE ADULT - ASSESSMENT
79yo M, w/ PMH/o a.fib. (currently rate-controlled, on digoxin and coumadin), insulin-dependent T2DM, MI, CAD, ischemic cardiomyopathy s/p CABG in 2000, CHF s/p ICD/PPM placement (last echo 4/17 EF 27%; St. Aj device, last interrogation unknown), HLD, skin CA s/p resection, presenting with R index finger pain and swelling with fracture after injury on 8/1, admitted for r/o osteomyelitis.    Right Index Finger Fx  - Cardio initially consulted for cardiac clearance for possible surgical intervention. Plastic Surgery was consulted, determined no definite indication for acute surgical intervention, outpatient f/u recommended.  - Pain control  - Antibiotics as per ID    CAD s/p CABG  - Resume ASA  - Continue with Digoxin,Toprol   - Continue statin    Atrial Fibrillation.  - Echo in 4/17 showed severe LV dysfunction, EF 27%, LAE, moderate MR, and akinetic mid-distal septum, apex, and anterior and inferolateral walls.  s/p ICD/PPM   - Was taking Coumadin at home.  Primary planning to switch to NOAC- Would initiate since no plan for surgical intervention   - Monitor and replete lytes, keep K>4, Mg>2.    HFrEF s/p ICD ef 27%  Euvolemic on exam    ACEI held on admission for QUANG       HLD  - Continue statin    DM2  - Care per Primary    Lisbet PRIETOP-C  Cardiology NP  [phone]    - All other workup per primary team.  - Will continue to follow with you.    Bridgett Howard DNP  Cardiology   [facility][phone] 77yo M, w/ PMH/o a.fib. (currently rate-controlled, on digoxin and coumadin), insulin-dependent T2DM, MI, CAD, ischemic cardiomyopathy s/p CABG in 2000, CHF s/p ICD/PPM placement (last echo 4/17 EF 27%; St. Aj device, last interrogation unknown), HLD, skin CA s/p resection, presenting with R index finger pain and swelling with fracture after injury on 8/1, admitted for r/o osteomyelitis.    Right Index Finger Fx  - Cardio initially consulted for cardiac clearance for possible surgical intervention. Plastic Surgery was consulted, determined no definite indication for acute surgical intervention, outpatient f/u recommended.  - Pain control  - Antibiotics as per ID    CAD s/p CABG  - Resume ASA  - Continue with Digoxin,Toprol   - Continue statin    Atrial Fibrillation.  - Echo in 4/17 showed severe LV dysfunction, EF 27%, LAE, moderate MR, and akinetic mid-distal septum, apex, and anterior and inferolateral walls.  s/p ICD/PPM   - Was taking Coumadin at home.  Primary planning to switch to NOAC- Would initiate since no plan for surgical intervention   - Monitor and replete lytes, keep K>4, Mg>2.    HFrEF s/p ICD ef 27%  Euvolemic on exam    ACEI held on admission for QUANG       HLD  - Continue statin    DM2  - Care per Primary      - All other workup per primary team.  - Will continue to follow with you.    Lisbet Ortiz FNP-C  Cardiology NP  [phone]

## 2019-08-08 NOTE — PROGRESS NOTE ADULT - PROBLEM SELECTOR PLAN 9
- SCDs  - Will restart chemical anticoagulation when safe after PICC procedure
- SCDs  - Chemical anticoagulation held for IR procedure tomorrow

## 2019-08-08 NOTE — PROGRESS NOTE ADULT - REASON FOR ADMISSION
finger swelling and pain

## 2019-08-08 NOTE — PROGRESS NOTE ADULT - PROBLEM SELECTOR PROBLEM 3
Atrial Fibrillation
Atrial Fibrillation
QUANG (acute kidney injury)
QUANG (acute kidney injury)
Atrial Fibrillation

## 2019-08-08 NOTE — PROGRESS NOTE ADULT - NSHPATTENDINGPLANDISCUSS_GEN_ALL_CORE
Dr Mejia
Dr Mejia
patient re: anticipated hospital course, need for long term antibiotics, possible picc line, ID eval.
Dr Shields PGY 1 PVFMRP, Dr Echevarria ID

## 2019-08-08 NOTE — PROGRESS NOTE ADULT - PROBLEM SELECTOR PLAN 4
Chronic, has hx of skin CA s/p resection  - Apply bacitracin to area 2x/day  - Pt has appointment for f/u with Dr. Galicia (derm) as outpatient

## 2019-08-08 NOTE — PROGRESS NOTE ADULT - PROBLEM SELECTOR PLAN 6
Chronic, stable  - Continue atorvastatin 10mg at bedtime
Diabetes type II (not on home insulin)  Hold oral hypoglycemic meds  Insulin Corrective Scale  Finger sticks per routine  Consistent Carb Diet  Hemoglobin A1c in AM  Hypoglycemia protocol
Chronic, stable  - Continue atorvastatin 10mg at bedtime

## 2019-08-08 NOTE — PROGRESS NOTE ADULT - ATTENDING COMMENTS
Chart reviewed    Patient seen and examined    Agree with plan as outlined above
I saw and examined the patient personally. Spoke with above provider regarding this case. I reviewed the above findings completely.  I agree with the above history, physical, and plan which I have edited where appropriate.
hand surgeon noted-- unlikely that limb is salvageable  attempting medical management now per pt.
hand surgeon noted-- unlikely that limb is salvageable  attempting medical management now per pt.
78 year old male PMH AFib (on coumadin), DM II (on insulin) CAD, hyperlipidemia, ischemic cardiomyopathy and pshx of CABG, PPM/ICD presents with right index finger pain and swelling, found to have open, displaced fractures of R second digit with high risk of osteomyelitis. Currently on ceftriaxone and vancomycin. Plan: cont antibx, picc placed tomorrow, pt refuses ot eval, dc planning with iv antibx once cultures resulted and ID ok with selection, transition poss to noac if pt accepts if  not bridge to coumadin until therapeutic, monitor clinical course

## 2019-08-08 NOTE — PROGRESS NOTE ADULT - SUBJECTIVE AND OBJECTIVE BOX
Patient is a 78y old  Male who presents with a chief complaint of finger swelling and pain.      INTERVAL HPI:  OVERNIGHT EVENTS:  T(C): 36.8 (08-08-19 @ 05:20), Max: 36.8 (08-08-19 @ 05:20)  HR: 60 (08-08-19 @ 05:20) (60 - 61)  BP: 148/78 (08-08-19 @ 05:20) (101/60 - 148/78)  RR: 17 (08-08-19 @ 05:20) (17 - 19)  SpO2: 97% (08-08-19 @ 05:20) (94% - 98%)  I&O's Summary    07 Aug 2019 07:01  -  08 Aug 2019 07:00  --------------------------------------------------------  IN: 520 mL / OUT: 0 mL / NET: 520 mL        REVIEW OF SYSTEMS:  CONSTITUTIONAL: No fever  NECK: No pain   RESPIRATORY: No cough, wheezing, or hemoptysis; No shortness of breath  CARDIOVASCULAR: No chest pain, palpitations, dizziness, or leg swelling  GASTROINTESTINAL: No abdominal or epigastric pain. No nausea, vomiting, or hematemesis; No diarrhea or constipation. No melena or hematochezia.  GENITOURINARY: No dysuria, frequency, hematuria, or incontinence  NEUROLOGICAL: No headaches, loss of strength, numbness, or tremors  SKIN: Admits to skin ca removal site on anterior chest  MUSCULOSKELETAL: No joint pain or swelling; No muscle, back, or extremity pain  PSYCHIATRIC: Admits to difficulty sleeping last night    PHYSICAL EXAM:  GENERAL: NAD, well-groomed, well-developed  HEAD:  Atraumatic, Normocephalic  EYES: EOMI, conjunctiva and sclera clear  ENMT: No tonsillar erythema, exudates, or enlargement; Moist mucous membranes, Good dentition, No lesions  NECK:   NERVOUS SYSTEM:  Alert & Oriented X3, Good concentration; Motor Strength 5/5 B/L upper and lower extremities; DTRs 2+ intact and symmetric  CHEST/LUNG: Clear to percussion bilaterally; No rales, rhonchi, wheezing, or rubs  HEART: Regular rate and rhythm; No murmurs, rubs, or gallops  ABDOMEN: Soft, Nontender, Nondistended; Bowel sounds present  EXTREMITIES:  2+ Peripheral Pulses, No clubbing, cyanosis, or edema  LYMPH: No lymphadenopathy noted  SKIN: No rashes or lesions    LABS:                        13.2   5.89  )-----------( 128      ( 08 Aug 2019 08:40 )             40.3     08-08    140  |  107  |  23  ----------------------------<  247<H>  4.0   |  23  |  1.40<H>    Ca    9.2      08 Aug 2019 08:40      PT/INR - ( 07 Aug 2019 08:22 )   PT: 17.1 sec;   INR: 1.48 ratio             CAPILLARY BLOOD GLUCOSE      POCT Blood Glucose.: 242 mg/dL (08 Aug 2019 11:51)  POCT Blood Glucose.: 217 mg/dL (08 Aug 2019 08:14)  POCT Blood Glucose.: 190 mg/dL (07 Aug 2019 21:44)  POCT Blood Glucose.: 247 mg/dL (07 Aug 2019 17:24)      08-06 @ 08:32   Few Proteus mirabilis  --  --  08-05 @ 21:15   No growth to date.  --  --          Diet:    RADIOLOGY & ADDITIONAL TESTS:    Imaging Personally Reviewed:  [ ] YES  [ ] NO    Consultant(s) Notes Reviewed:  [ ] YES  [ ] NO    MEDICATIONS  (STANDING):  amLODIPine   Tablet 5 milliGRAM(s) Oral daily  atorvastatin 10 milliGRAM(s) Oral at bedtime  BACItracin   Ointment 1 Application(s) Topical two times a day  cefTRIAXone   IVPB 1000 milliGRAM(s) IV Intermittent every 24 hours  dextrose 5%. 1000 milliLiter(s) (50 mL/Hr) IV Continuous <Continuous>  dextrose 50% Injectable 12.5 Gram(s) IV Push once  dextrose 50% Injectable 25 Gram(s) IV Push once  dextrose 50% Injectable 25 Gram(s) IV Push once  digoxin     Tablet 0.125 milliGRAM(s) Oral every other day  insulin lispro (HumaLOG) corrective regimen sliding scale   SubCutaneous three times a day before meals  insulin lispro (HumaLOG) corrective regimen sliding scale   SubCutaneous at bedtime  metoprolol succinate ER 25 milliGRAM(s) Oral daily  pantoprazole    Tablet 40 milliGRAM(s) Oral before breakfast  saccharomyces boulardii 250 milliGRAM(s) Oral two times a day  sodium chloride 0.9%. 1000 milliLiter(s) (50 mL/Hr) IV Continuous <Continuous>  vancomycin  IVPB 1000 milliGRAM(s) IV Intermittent every 24 hours    MEDICATIONS  (PRN):  acetaminophen   Tablet .. 650 milliGRAM(s) Oral every 6 hours PRN Mild Pain (1 - 3)  dextrose 40% Gel 15 Gram(s) Oral once PRN Blood Glucose LESS THAN 70 milliGRAM(s)/deciliter  glucagon  Injectable 1 milliGRAM(s) IntraMuscular once PRN Glucose LESS THAN 70 milligrams/deciliter      Assessment and Plan:    1.     2.     3.     4.    5.    6.    7.      Disposition:    DVT Prophylaxis:  Subcu Heparin [  ]     LMWH [ ]     Coumadin [ ]    Xaeralto [ ]    Eliquis [ ]   Venodyne pumps [ ]    Discussed with Patient [ ]     Family [ ]          [ ]   RN[ ]      [ ]    Advance Directives:      Palliative Care:    Care Discussed with Consultants/Other Providers [ ] YES  [ ] NO    [  ] Teaching Attending Addendum: [  ] Present with Resident      I saw and evaluated the patient. Discussed with Resident,  _____________________ and agree with the resident's findings and plan as documented in the resident's note, with the following revision made as necessary.     Attending Comments:          Time spent: Patient is a 78y old  Male who presents with a chief complaint of finger swelling and pain.      INTERVAL HPI:  OVERNIGHT EVENTS:  T(C): 36.8 (08-08-19 @ 05:20), Max: 36.8 (08-08-19 @ 05:20)  HR: 60 (08-08-19 @ 05:20) (60 - 61)  BP: 148/78 (08-08-19 @ 05:20) (101/60 - 148/78)  RR: 17 (08-08-19 @ 05:20) (17 - 19)  SpO2: 97% (08-08-19 @ 05:20) (94% - 98%)  I&O's Summary    07 Aug 2019 07:01  -  08 Aug 2019 07:00  --------------------------------------------------------  IN: 520 mL / OUT: 0 mL / NET: 520 mL        REVIEW OF SYSTEMS:  CONSTITUTIONAL: No fever  NECK: No pain   RESPIRATORY: No cough, wheezing, or hemoptysis; No shortness of breath  CARDIOVASCULAR: No chest pain, palpitations, dizziness, or leg swelling  GASTROINTESTINAL: No abdominal or epigastric pain. No nausea, vomiting, or hematemesis; No diarrhea or constipation. No melena or hematochezia.  GENITOURINARY: No dysuria, frequency, hematuria, or incontinence  NEUROLOGICAL: No headaches, loss of strength, numbness, or tremors  MUSCULOSKELETAL: No joint pain or swelling; No muscle, back, or extremity pain  PSYCHIATRIC: Admits to difficulty sleeping last night    PHYSICAL EXAM:  GENERAL: NAD, well-groomed, well-developed  HEAD:  Atraumatic, Normocephalic  EYES: EOMI, conjunctiva and sclera clear  ENMT: No tonsillar erythema, exudates, or enlargement; Moist mucous membranes, Good dentition, No lesions  NERVOUS SYSTEM:  Alert & Oriented X3, Good concentration  CHEST/LUNG: Clear to auscultation bilaterally; No rales, rhonchi, wheezing, or rubs  HEART: Regular rate and irregular rhythm; No murmurs, rubs, or gallops  ABDOMEN: Soft, Nontender, Nondistended; Bowel sounds present  SKIN: 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; Well-healing skin ca resection site on anterior chest without surrounding erythema or drainage    LABS:                        13.2   5.89  )-----------( 128      ( 08 Aug 2019 08:40 )             40.3     08-08    140  |  107  |  23  ----------------------------<  247<H>  4.0   |  23  |  1.40<H>    Ca    9.2      08 Aug 2019 08:40      PT/INR - ( 07 Aug 2019 08:22 )   PT: 17.1 sec;   INR: 1.48 ratio             CAPILLARY BLOOD GLUCOSE      POCT Blood Glucose.: 242 mg/dL (08 Aug 2019 11:51)  POCT Blood Glucose.: 217 mg/dL (08 Aug 2019 08:14)  POCT Blood Glucose.: 190 mg/dL (07 Aug 2019 21:44)  POCT Blood Glucose.: 247 mg/dL (07 Aug 2019 17:24)      08-06 @ 08:32   Few Proteus mirabilis  --  --  08-05 @ 21:15   No growth to date.  --  --          Diet:    RADIOLOGY & ADDITIONAL TESTS:    Imaging Personally Reviewed:  [x] YES  [ ] NO    Consultant(s) Notes Reviewed:  [x] YES  [ ] NO    MEDICATIONS  (STANDING):  amLODIPine   Tablet 5 milliGRAM(s) Oral daily  atorvastatin 10 milliGRAM(s) Oral at bedtime  BACItracin   Ointment 1 Application(s) Topical two times a day  cefTRIAXone   IVPB 1000 milliGRAM(s) IV Intermittent every 24 hours  dextrose 5%. 1000 milliLiter(s) (50 mL/Hr) IV Continuous <Continuous>  dextrose 50% Injectable 12.5 Gram(s) IV Push once  dextrose 50% Injectable 25 Gram(s) IV Push once  dextrose 50% Injectable 25 Gram(s) IV Push once  digoxin     Tablet 0.125 milliGRAM(s) Oral every other day  insulin lispro (HumaLOG) corrective regimen sliding scale   SubCutaneous three times a day before meals  insulin lispro (HumaLOG) corrective regimen sliding scale   SubCutaneous at bedtime  metoprolol succinate ER 25 milliGRAM(s) Oral daily  pantoprazole    Tablet 40 milliGRAM(s) Oral before breakfast  saccharomyces boulardii 250 milliGRAM(s) Oral two times a day  sodium chloride 0.9%. 1000 milliLiter(s) (50 mL/Hr) IV Continuous <Continuous>  vancomycin  IVPB 1000 milliGRAM(s) IV Intermittent every 24 hours    MEDICATIONS  (PRN):  acetaminophen   Tablet .. 650 milliGRAM(s) Oral every 6 hours PRN Mild Pain (1 - 3)  dextrose 40% Gel 15 Gram(s) Oral once PRN Blood Glucose LESS THAN 70 milliGRAM(s)/deciliter  glucagon  Injectable 1 milliGRAM(s) IntraMuscular once PRN Glucose LESS THAN 70 milligrams/deciliter      Assessment and Plan: As below      Disposition:    DVT Prophylaxis:  Subcu Heparin [  ]     LMWH [ ]     Coumadin [ ]    Xaeralto [ ]    Eliquis [ ]   Venodyne pumps [x ]    Discussed with Patient [x ]     Family [ ]          [ ]   RN[ ]      [ ]    Advance Directives:      Palliative Care:    Care Discussed with Consultants/Other Providers [ ] YES  [ ] NO    [  ] Teaching Attending Addendum: [  ] Present with Resident      I saw and evaluated the patient. Discussed with Resident,  _____________________ and agree with the resident's findings and plan as documented in the resident's note, with the following revision made as necessary.     Attending Comments:          Time spent: Patient is a 78y old  Male who presents with a chief complaint of finger swelling and pain.      INTERVAL HPI: Patient seen and examined at bedside. States he slept poorly last night because of the commotion of being in the hospital. Denies any currently pain in his right hand. Had a soft, formed BM this morning, no blood. Urinating independently without any urinary complaints. Denies any headache, chest pain, SOB, abdominal pain, n/v/d, constipation, pain/edema of the lower extremities.     OVERNIGHT EVENTS: No acute events overnight.    T(C): 36.8 (08-08-19 @ 05:20), Max: 36.8 (08-08-19 @ 05:20)  HR: 60 (08-08-19 @ 05:20) (60 - 61)  BP: 148/78 (08-08-19 @ 05:20) (101/60 - 148/78)  RR: 17 (08-08-19 @ 05:20) (17 - 19)  SpO2: 97% (08-08-19 @ 05:20) (94% - 98%)  I&O's Summary    07 Aug 2019 07:01  -  08 Aug 2019 07:00  --------------------------------------------------------  IN: 520 mL / OUT: 0 mL / NET: 520 mL        REVIEW OF SYSTEMS:  CONSTITUTIONAL: No fever  NECK: No pain   RESPIRATORY: No cough, wheezing, or hemoptysis; No shortness of breath  CARDIOVASCULAR: No chest pain, palpitations, dizziness, or leg swelling  GASTROINTESTINAL: No abdominal or epigastric pain. No nausea, vomiting, or hematemesis; No diarrhea or constipation. No melena or hematochezia.  GENITOURINARY: No dysuria, frequency, hematuria, or incontinence  NEUROLOGICAL: No headaches, loss of strength, numbness, or tremors  MUSCULOSKELETAL: No joint pain or swelling; No muscle, back, or extremity pain  PSYCHIATRIC: Admits to difficulty sleeping last night    PHYSICAL EXAM:  GENERAL: NAD, well-groomed, well-developed  HEAD:  Atraumatic, Normocephalic  EYES: EOMI, conjunctiva and sclera clear  ENMT: No tonsillar erythema, exudates, or enlargement; Moist mucous membranes, Good dentition, No lesions  NERVOUS SYSTEM:  Alert & Oriented X3, Good concentration  CHEST/LUNG: Clear to auscultation bilaterally; No rales, rhonchi, wheezing, or rubs  HEART: Regular rate and irregular rhythm; No murmurs, rubs, or gallops  ABDOMEN: Soft, Nontender, Nondistended; Bowel sounds present  SKIN: 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; Well-healing skin ca resection site on anterior chest without surrounding erythema or drainage    LABS:                        13.2   5.89  )-----------( 128      ( 08 Aug 2019 08:40 )             40.3     08-08    140  |  107  |  23  ----------------------------<  247<H>  4.0   |  23  |  1.40<H>    Ca    9.2      08 Aug 2019 08:40      PT/INR - ( 07 Aug 2019 08:22 )   PT: 17.1 sec;   INR: 1.48 ratio             CAPILLARY BLOOD GLUCOSE      POCT Blood Glucose.: 242 mg/dL (08 Aug 2019 11:51)  POCT Blood Glucose.: 217 mg/dL (08 Aug 2019 08:14)  POCT Blood Glucose.: 190 mg/dL (07 Aug 2019 21:44)  POCT Blood Glucose.: 247 mg/dL (07 Aug 2019 17:24)      08-06 @ 08:32   Few Proteus mirabilis  --  --  08-05 @ 21:15   No growth to date.  --  --          Diet:    RADIOLOGY & ADDITIONAL TESTS:    Imaging Personally Reviewed:  [x] YES  [ ] NO    Consultant(s) Notes Reviewed:  [x] YES  [ ] NO    MEDICATIONS  (STANDING):  amLODIPine   Tablet 5 milliGRAM(s) Oral daily  atorvastatin 10 milliGRAM(s) Oral at bedtime  BACItracin   Ointment 1 Application(s) Topical two times a day  cefTRIAXone   IVPB 1000 milliGRAM(s) IV Intermittent every 24 hours  dextrose 5%. 1000 milliLiter(s) (50 mL/Hr) IV Continuous <Continuous>  dextrose 50% Injectable 12.5 Gram(s) IV Push once  dextrose 50% Injectable 25 Gram(s) IV Push once  dextrose 50% Injectable 25 Gram(s) IV Push once  digoxin     Tablet 0.125 milliGRAM(s) Oral every other day  insulin lispro (HumaLOG) corrective regimen sliding scale   SubCutaneous three times a day before meals  insulin lispro (HumaLOG) corrective regimen sliding scale   SubCutaneous at bedtime  metoprolol succinate ER 25 milliGRAM(s) Oral daily  pantoprazole    Tablet 40 milliGRAM(s) Oral before breakfast  saccharomyces boulardii 250 milliGRAM(s) Oral two times a day  sodium chloride 0.9%. 1000 milliLiter(s) (50 mL/Hr) IV Continuous <Continuous>  vancomycin  IVPB 1000 milliGRAM(s) IV Intermittent every 24 hours    MEDICATIONS  (PRN):  acetaminophen   Tablet .. 650 milliGRAM(s) Oral every 6 hours PRN Mild Pain (1 - 3)  dextrose 40% Gel 15 Gram(s) Oral once PRN Blood Glucose LESS THAN 70 milliGRAM(s)/deciliter  glucagon  Injectable 1 milliGRAM(s) IntraMuscular once PRN Glucose LESS THAN 70 milligrams/deciliter      Assessment and Plan: As below      Disposition:    DVT Prophylaxis:  Subcu Heparin [  ]     LMWH [ ]     Coumadin [ ]    Xaeralto [ ]    Eliquis [ ]   Venodyne pumps [x ]    Discussed with Patient [x ]     Family [ ]          [ ]   RN[ ]      [ ]    Advance Directives:      Palliative Care:    Care Discussed with Consultants/Other Providers [ ] YES  [ ] NO    [  ] Teaching Attending Addendum: [  ] Present with Resident      I saw and evaluated the patient. Discussed with Resident,  _____________________ and agree with the resident's findings and plan as documented in the resident's note, with the following revision made as necessary.     Attending Comments:          Time spent:

## 2019-08-08 NOTE — PROGRESS NOTE ADULT - PROBLEM SELECTOR PLAN 3
Chronic, stable  - Continue Digoxin .125 mg every other day  - Will restart coumadin after PICC procedure, consider NOAC if pt agreeable to medication change/cost  - Cardio Dr. Garland following, recommendations appreciated Chronic, stable  - Continue Digoxin .125 mg every other day  - Continue Metoprolol succ 25mg daily  - Will restart coumadin after tunnel catheter procedure, NOAC determined to not be cost effective for patient  - Cardio Dr. Garland following, recommendations appreciated

## 2019-08-10 LAB
CULTURE RESULTS: SIGNIFICANT CHANGE UP
CULTURE RESULTS: SIGNIFICANT CHANGE UP
SPECIMEN SOURCE: SIGNIFICANT CHANGE UP
SPECIMEN SOURCE: SIGNIFICANT CHANGE UP

## 2019-08-12 PROBLEM — K21.9 GASTRO-ESOPHAGEAL REFLUX DISEASE WITHOUT ESOPHAGITIS: Chronic | Status: ACTIVE | Noted: 2019-08-05

## 2019-08-12 PROBLEM — E11.9 TYPE 2 DIABETES MELLITUS WITHOUT COMPLICATIONS: Chronic | Status: ACTIVE | Noted: 2019-08-05

## 2019-08-12 PROBLEM — Z98.890 OTHER SPECIFIED POSTPROCEDURAL STATES: Chronic | Status: ACTIVE | Noted: 2019-08-05

## 2019-08-16 ENCOUNTER — APPOINTMENT (OUTPATIENT)
Dept: INTERNAL MEDICINE | Facility: CLINIC | Age: 79
End: 2019-08-16
Payer: MEDICARE

## 2019-08-16 VITALS
RESPIRATION RATE: 14 BRPM | HEIGHT: 72 IN | DIASTOLIC BLOOD PRESSURE: 72 MMHG | HEART RATE: 84 BPM | BODY MASS INDEX: 20.32 KG/M2 | WEIGHT: 150 LBS | OXYGEN SATURATION: 98 % | TEMPERATURE: 208.94 F | SYSTOLIC BLOOD PRESSURE: 115 MMHG

## 2019-08-16 PROCEDURE — 36415 COLL VENOUS BLD VENIPUNCTURE: CPT

## 2019-08-16 PROCEDURE — 99213 OFFICE O/P EST LOW 20 MIN: CPT | Mod: 25

## 2019-08-16 NOTE — PHYSICAL EXAM
[Normal Sclera/Conjunctiva] : normal sclera/conjunctiva [No JVD] : no jugular venous distention [No Respiratory Distress] : no respiratory distress  [No Accessory Muscle Use] : no accessory muscle use [Clear to Auscultation] : lungs were clear to auscultation bilaterally [No Edema] : there was no peripheral edema [Soft] : abdomen soft [Non Tender] : non-tender [Non-distended] : non-distended [de-identified] :  vr 60

## 2019-08-16 NOTE — REVIEW OF SYSTEMS
[Fever] : no fever [Chills] : no chills [Discharge] : no discharge [Chest Pain] : no chest pain [Palpitations] : no palpitations [Lower Ext Edema] : no lower extremity edema [Wheezing] : no wheezing [Shortness Of Breath] : no shortness of breath [Abdominal Pain] : no abdominal pain [FreeTextEntry9] : finger is wrapped in gauze  Just returned from Dr Toth office

## 2019-08-16 NOTE — HISTORY OF PRESENT ILLNESS
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

## 2019-08-19 LAB
ALBUMIN SERPL ELPH-MCNC: 4 G/DL
ALP BLD-CCNC: 111 U/L
ALT SERPL-CCNC: 39 U/L
ANION GAP SERPL CALC-SCNC: 14 MMOL/L
AST SERPL-CCNC: 33 U/L
BASOPHILS # BLD AUTO: 0.08 K/UL
BASOPHILS NFR BLD AUTO: 1.4 %
BILIRUB SERPL-MCNC: 0.8 MG/DL
BUN SERPL-MCNC: 24 MG/DL
CALCIUM SERPL-MCNC: 9.8 MG/DL
CHLORIDE SERPL-SCNC: 102 MMOL/L
CO2 SERPL-SCNC: 26 MMOL/L
CREAT SERPL-MCNC: 1.48 MG/DL
EOSINOPHIL # BLD AUTO: 0.45 K/UL
EOSINOPHIL NFR BLD AUTO: 7.9 %
GLUCOSE SERPL-MCNC: 371 MG/DL
HCT VFR BLD CALC: 42.7 %
HGB BLD-MCNC: 13.1 G/DL
IMM GRANULOCYTES NFR BLD AUTO: 0.2 %
INR PPP: 1.45 RATIO
LYMPHOCYTES # BLD AUTO: 1.17 K/UL
LYMPHOCYTES NFR BLD AUTO: 20.5 %
MAN DIFF?: NORMAL
MCHC RBC-ENTMCNC: 30.4 PG
MCHC RBC-ENTMCNC: 30.7 GM/DL
MCV RBC AUTO: 99.1 FL
MONOCYTES # BLD AUTO: 0.54 K/UL
MONOCYTES NFR BLD AUTO: 9.5 %
NEUTROPHILS # BLD AUTO: 3.46 K/UL
NEUTROPHILS NFR BLD AUTO: 60.5 %
PLATELET # BLD AUTO: 125 K/UL
POTASSIUM SERPL-SCNC: 4.1 MMOL/L
PROT SERPL-MCNC: 7.2 G/DL
PT BLD: 16.9 SEC
RBC # BLD: 4.31 M/UL
RBC # FLD: 13.8 %
SODIUM SERPL-SCNC: 142 MMOL/L
WBC # FLD AUTO: 5.71 K/UL

## 2019-08-27 ENCOUNTER — RECORD ABSTRACTING (OUTPATIENT)
Age: 79
End: 2019-08-27

## 2019-09-13 ENCOUNTER — APPOINTMENT (OUTPATIENT)
Dept: INTERNAL MEDICINE | Facility: CLINIC | Age: 79
End: 2019-09-13
Payer: MEDICARE

## 2019-09-13 ENCOUNTER — RECORD ABSTRACTING (OUTPATIENT)
Age: 79
End: 2019-09-13

## 2019-09-13 VITALS
HEIGHT: 72 IN | BODY MASS INDEX: 20.45 KG/M2 | TEMPERATURE: 208.58 F | OXYGEN SATURATION: 99 % | WEIGHT: 151 LBS | SYSTOLIC BLOOD PRESSURE: 121 MMHG | HEART RATE: 83 BPM | DIASTOLIC BLOOD PRESSURE: 66 MMHG

## 2019-09-13 PROCEDURE — 90662 IIV NO PRSV INCREASED AG IM: CPT

## 2019-09-13 PROCEDURE — 99214 OFFICE O/P EST MOD 30 MIN: CPT | Mod: 25

## 2019-09-13 PROCEDURE — 36415 COLL VENOUS BLD VENIPUNCTURE: CPT

## 2019-09-13 PROCEDURE — G0008: CPT

## 2019-09-13 NOTE — PHYSICAL EXAM
[No Acute Distress] : no acute distress [Well Nourished] : well nourished [Normal Sclera/Conjunctiva] : normal sclera/conjunctiva [PERRL] : pupils equal round and reactive to light [Normal Outer Ear/Nose] : the outer ears and nose were normal in appearance [Normal Oropharynx] : the oropharynx was normal [No JVD] : no jugular venous distention [No Lymphadenopathy] : no lymphadenopathy [No Respiratory Distress] : no respiratory distress  [No Accessory Muscle Use] : no accessory muscle use [Clear to Auscultation] : lungs were clear to auscultation bilaterally [No Extremity Clubbing/Cyanosis] : no extremity clubbing/cyanosis [Soft] : abdomen soft [Non Tender] : non-tender [Non-distended] : non-distended [de-identified] : rt 2nd finger in bandage [de-identified] :  vr 60 [de-identified] : psoriasis

## 2019-09-13 NOTE — REVIEW OF SYSTEMS
[Lower Ext Edema] : lower extremity edema [Fever] : no fever [Chills] : no chills [Vision Problems] : no vision problems [Chest Pain] : no chest pain [Palpitations] : no palpitations [Shortness Of Breath] : no shortness of breath [Wheezing] : no wheezing [Cough] : no cough [Dyspnea on Exertion] : not dyspnea on exertion [Abdominal Pain] : no abdominal pain [Melena] : no melena [Headache] : no headache [Fainting] : no fainting [Unsteady Walk] : no ataxia [FreeTextEntry9] : rt 2nd finger much less swollen

## 2019-09-13 NOTE — HISTORY OF PRESENT ILLNESS
[de-identified] : Patient comes for FBW and med renewal\par   Pt being seen by visiting nurse for finger regularly and it is much improved

## 2019-09-16 LAB
ALBUMIN SERPL ELPH-MCNC: 4.2 G/DL
ALP BLD-CCNC: 123 U/L
ALT SERPL-CCNC: 24 U/L
ANION GAP SERPL CALC-SCNC: 17 MMOL/L
AST SERPL-CCNC: 25 U/L
BASOPHILS # BLD AUTO: 0.06 K/UL
BASOPHILS NFR BLD AUTO: 1.2 %
BILIRUB SERPL-MCNC: 1.3 MG/DL
BUN SERPL-MCNC: 18 MG/DL
CALCIUM SERPL-MCNC: 10 MG/DL
CHLORIDE SERPL-SCNC: 101 MMOL/L
CHOLEST SERPL-MCNC: 159 MG/DL
CHOLEST/HDLC SERPL: 4.1 RATIO
CO2 SERPL-SCNC: 25 MMOL/L
CREAT SERPL-MCNC: 1.59 MG/DL
EOSINOPHIL # BLD AUTO: 0.43 K/UL
EOSINOPHIL NFR BLD AUTO: 8.4 %
ESTIMATED AVERAGE GLUCOSE: 206 MG/DL
GLUCOSE SERPL-MCNC: 280 MG/DL
HBA1C MFR BLD HPLC: 8.8 %
HCT VFR BLD CALC: 40.7 %
HDLC SERPL-MCNC: 39 MG/DL
HGB BLD-MCNC: 12.9 G/DL
IMM GRANULOCYTES NFR BLD AUTO: 0.2 %
INR PPP: 2.58 RATIO
LDLC SERPL CALC-MCNC: 102 MG/DL
LYMPHOCYTES # BLD AUTO: 0.79 K/UL
LYMPHOCYTES NFR BLD AUTO: 15.4 %
MAN DIFF?: NORMAL
MCHC RBC-ENTMCNC: 30.9 PG
MCHC RBC-ENTMCNC: 31.7 GM/DL
MCV RBC AUTO: 97.6 FL
MONOCYTES # BLD AUTO: 0.4 K/UL
MONOCYTES NFR BLD AUTO: 7.8 %
NEUTROPHILS # BLD AUTO: 3.44 K/UL
NEUTROPHILS NFR BLD AUTO: 67 %
PLATELET # BLD AUTO: 115 K/UL
POTASSIUM SERPL-SCNC: 3.8 MMOL/L
PROT SERPL-MCNC: 6.9 G/DL
PT BLD: 30.5 SEC
RBC # BLD: 4.17 M/UL
RBC # FLD: 14.9 %
SODIUM SERPL-SCNC: 143 MMOL/L
TRIGL SERPL-MCNC: 90 MG/DL
TSH SERPL-ACNC: 9.54 UIU/ML
WBC # FLD AUTO: 5.13 K/UL

## 2019-09-27 ENCOUNTER — EMERGENCY (EMERGENCY)
Facility: HOSPITAL | Age: 79
LOS: 1 days | Discharge: HOME | End: 2019-09-27
Attending: EMERGENCY MEDICINE | Admitting: EMERGENCY MEDICINE
Payer: MEDICARE

## 2019-09-27 VITALS
DIASTOLIC BLOOD PRESSURE: 70 MMHG | HEART RATE: 73 BPM | RESPIRATION RATE: 18 BRPM | TEMPERATURE: 98 F | OXYGEN SATURATION: 100 % | SYSTOLIC BLOOD PRESSURE: 137 MMHG | HEIGHT: 73 IN | WEIGHT: 149.91 LBS

## 2019-09-27 VITALS
RESPIRATION RATE: 16 BRPM | HEART RATE: 66 BPM | SYSTOLIC BLOOD PRESSURE: 124 MMHG | DIASTOLIC BLOOD PRESSURE: 70 MMHG | TEMPERATURE: 98 F

## 2019-09-27 LAB
APTT BLD: 42.1 SEC — HIGH (ref 28.5–37)
INR BLD: 2.63 RATIO — HIGH (ref 0.88–1.16)
PROTHROM AB SERPL-ACNC: 30.6 SEC — HIGH (ref 10–12.9)

## 2019-09-27 PROCEDURE — 85610 PROTHROMBIN TIME: CPT

## 2019-09-27 PROCEDURE — 99283 EMERGENCY DEPT VISIT LOW MDM: CPT

## 2019-09-27 PROCEDURE — 99284 EMERGENCY DEPT VISIT MOD MDM: CPT

## 2019-09-27 PROCEDURE — 36415 COLL VENOUS BLD VENIPUNCTURE: CPT

## 2019-09-27 PROCEDURE — 85730 THROMBOPLASTIN TIME PARTIAL: CPT

## 2019-09-27 RX ORDER — DIGOXIN 250 MCG
1 TABLET ORAL
Qty: 0 | Refills: 0 | DISCHARGE

## 2019-09-27 RX ORDER — ATORVASTATIN CALCIUM 80 MG/1
1 TABLET, FILM COATED ORAL
Qty: 0 | Refills: 0 | DISCHARGE

## 2019-09-27 RX ORDER — PANTOPRAZOLE SODIUM 20 MG/1
1 TABLET, DELAYED RELEASE ORAL
Qty: 0 | Refills: 0 | DISCHARGE

## 2019-09-27 NOTE — ED PROVIDER NOTE - OBJECTIVE STATEMENT
77 yo white male with Right Subclavian PICC line in place x 30 days here for removal of catheter today. No fever or chills. No nausea or vomiting.

## 2019-09-27 NOTE — ED PROVIDER NOTE - CONSTITUTIONAL, MLM
normal... Well appearing, thin, elderly white male, well nourished, awake, alert, oriented to person, place, time/situation and in no apparent distress.

## 2019-09-27 NOTE — ED ADULT NURSE NOTE - CHPI ED NUR SYMPTOMS NEG
no nausea/no tingling/no weakness/no chills/no fever/no decreased eating/drinking/no dizziness/no pain/no vomiting

## 2019-09-27 NOTE — ED ADULT NURSE REASSESSMENT NOTE - NS ED NURSE REASSESS COMMENT FT1
1130- Pt to IRD  from ER for tunn. picc removal. PT A&O X4. /75/ HR60 100% r/a and resp-21. Pt. stated he took his coumadin today. Dr. Rogel made aware, no bloods drawn in ER. Order obtained from Dr. Nuno for STAT INR, PT, PTT to be drawn. Blood drawn from right AC and sent to lab. ( Blood unable to be obtained from Picc). Waiting for results and pt made comfortable.

## 2019-09-27 NOTE — ED PROVIDER NOTE - PROGRESS NOTE DETAILS
Case d/w IR who will remove catheter. Unable to remove in ED with gentle traction. Could not remove PICC in IR as patient is on Coumadin so will hold Coumadin per IR and they will remove on Wednesday. IR doc spoke to PCP and agrees.

## 2019-09-27 NOTE — ED PROVIDER NOTE - NSFOLLOWUPINSTRUCTIONS_ED_ALL_ED_FT
Rest  Hold Coumadin on Sunday/Monday and Tuesday of this week  Return to Radiology as scheduled this Wednesday for PICC Line Removal.

## 2019-09-27 NOTE — ED PROVIDER NOTE - PRINCIPAL DIAGNOSIS
PICC (peripherally inserted central catheter) removal PICC (peripherally inserted central catheter) in place

## 2019-09-27 NOTE — ED ADULT NURSE NOTE - OBJECTIVE STATEMENT
pt aox4, " sent by PMD, Rt chest area pick line removal , inserted 1 month ago" no sob, no active bleeding at bed site

## 2019-09-27 NOTE — ED ADULT NURSE REASSESSMENT NOTE - NS ED NURSE REASSESS COMMENT FT1
Pt in IRD Stat INR 2.63. Dr Nuno aware and unable to removed due to high INR. Pt rescheduled for out patient procedure to remove PICC . As per PMD  Dr. Jamison  pt may be off Coumadin for 4days prior to removal of Picc. Scheduled for 10/2/19 Pt aware to not take coumadin 4days before. Dr. Hansen aware of rescheduled out patient Picc removal.  Report to [place] [facility] .Transferred back to ER at 1310

## 2019-09-27 NOTE — ED ADULT NURSE REASSESSMENT NOTE - NS ED NURSE REASSESS COMMENT FT1
pt aox4, disch to come back Wednesday for pic line removal, advise to stop coumadin till procedure done, verbalized understating

## 2019-09-27 NOTE — ED PROVIDER NOTE - CARE PLAN
Principal Discharge DX:	PICC (peripherally inserted central catheter) removal Principal Discharge DX:	PICC (peripherally inserted central catheter) in place

## 2019-09-27 NOTE — ED PROVIDER NOTE - PATIENT PORTAL LINK FT
You can access the FollowMyHealth Patient Portal offered by [facility] by registering at the following website: [email or web address]. By joining [employer]’s FollowMyHealth portal, you will also be able to view your health information using other applications (apps) compatible with our system.

## 2019-09-27 NOTE — ED ADULT NURSE NOTE - PMH
Atrial Fibrillation    CABG (Coronary Artery Bypass Graft)    CAD (Coronary Artery Disease)    Chronic GERD    H/O squamous cell carcinoma excision    Hyperlipidemia    ICD (Implantable Cardiac Defibrillator) in Place    Ischemic Cardiomyopathy    Type 2 diabetes mellitus    Ventricular Arrhythmia

## 2019-10-02 ENCOUNTER — OUTPATIENT (OUTPATIENT)
Dept: OUTPATIENT SERVICES | Facility: HOSPITAL | Age: 79
LOS: 1 days | End: 2019-10-02
Payer: MEDICARE

## 2019-10-02 DIAGNOSIS — L03.019 CELLULITIS OF UNSPECIFIED FINGER: ICD-10-CM

## 2019-10-02 PROCEDURE — 36589 REMOVAL TUNNELED CV CATH: CPT

## 2019-10-28 ENCOUNTER — APPOINTMENT (OUTPATIENT)
Dept: INTERNAL MEDICINE | Facility: CLINIC | Age: 79
End: 2019-10-28
Payer: MEDICARE

## 2019-10-28 VITALS — SYSTOLIC BLOOD PRESSURE: 121 MMHG | DIASTOLIC BLOOD PRESSURE: 70 MMHG

## 2019-10-28 PROCEDURE — 36415 COLL VENOUS BLD VENIPUNCTURE: CPT

## 2019-10-28 PROCEDURE — 99213 OFFICE O/P EST LOW 20 MIN: CPT | Mod: 25

## 2019-10-28 NOTE — REVIEW OF SYSTEMS
[Skin Rash] : skin rash [Fever] : no fever [Chills] : no chills [Chest Pain] : no chest pain [Palpitations] : no palpitations [Shortness Of Breath] : no shortness of breath [Wheezing] : no wheezing [Cough] : no cough [Abdominal Pain] : no abdominal pain [FreeTextEntry9] : finger healing but not bendable

## 2019-10-28 NOTE — PHYSICAL EXAM
[No Acute Distress] : no acute distress [No JVD] : no jugular venous distention [No Respiratory Distress] : no respiratory distress  [No Accessory Muscle Use] : no accessory muscle use [Clear to Auscultation] : lungs were clear to auscultation bilaterally [No Extremity Clubbing/Cyanosis] : no extremity clubbing/cyanosis [Soft] : abdomen soft [Non Tender] : non-tender [Non-distended] : non-distended [No Masses] : no abdominal mass palpated

## 2019-10-29 LAB
INR PPP: 3.15 RATIO
PT BLD: 37.2 SEC

## 2019-11-04 ENCOUNTER — RX RENEWAL (OUTPATIENT)
Age: 79
End: 2019-11-04

## 2019-12-02 ENCOUNTER — APPOINTMENT (OUTPATIENT)
Dept: INTERNAL MEDICINE | Facility: CLINIC | Age: 79
End: 2019-12-02
Payer: MEDICARE

## 2019-12-02 VITALS
OXYGEN SATURATION: 99 % | WEIGHT: 164 LBS | HEIGHT: 72 IN | HEART RATE: 82 BPM | TEMPERATURE: 208.4 F | BODY MASS INDEX: 22.21 KG/M2

## 2019-12-02 VITALS — SYSTOLIC BLOOD PRESSURE: 118 MMHG | DIASTOLIC BLOOD PRESSURE: 72 MMHG

## 2019-12-02 PROCEDURE — 36415 COLL VENOUS BLD VENIPUNCTURE: CPT

## 2019-12-02 PROCEDURE — 99213 OFFICE O/P EST LOW 20 MIN: CPT | Mod: 25

## 2019-12-02 RX ORDER — DAPAGLIFLOZIN 10 MG/1
10 TABLET, FILM COATED ORAL DAILY
Qty: 30 | Refills: 5 | Status: ACTIVE | COMMUNITY
Start: 2019-06-17 | End: 1900-01-01

## 2019-12-02 NOTE — REVIEW OF SYSTEMS
[Chills] : no chills [Chest Pain] : no chest pain [Palpitations] : no palpitations [Lower Ext Edema] : no lower extremity edema [Shortness Of Breath] : no shortness of breath [Wheezing] : no wheezing [Cough] : no cough [Abdominal Pain] : no abdominal pain

## 2019-12-02 NOTE — PHYSICAL EXAM
[No Acute Distress] : no acute distress [No Respiratory Distress] : no respiratory distress  [No Accessory Muscle Use] : no accessory muscle use [Clear to Auscultation] : lungs were clear to auscultation bilaterally [No Extremity Clubbing/Cyanosis] : no extremity clubbing/cyanosis [Soft] : abdomen soft [Non Tender] : non-tender [Non-distended] : non-distended [de-identified] : flaking skin on face c/w psoriasis and skin ca [de-identified] : no sig edema [de-identified] :  vr 52

## 2019-12-06 ENCOUNTER — APPOINTMENT (OUTPATIENT)
Dept: INTERNAL MEDICINE | Facility: CLINIC | Age: 79
End: 2019-12-06

## 2019-12-09 ENCOUNTER — OTHER (OUTPATIENT)
Age: 79
End: 2019-12-09

## 2019-12-10 LAB
ALBUMIN SERPL ELPH-MCNC: 4.4 G/DL
ALP BLD-CCNC: 83 U/L
ALT SERPL-CCNC: 21 U/L
ANION GAP SERPL CALC-SCNC: 13 MMOL/L
AST SERPL-CCNC: 26 U/L
BASOPHILS # BLD AUTO: 0.06 K/UL
BASOPHILS NFR BLD AUTO: 1.2 %
BILIRUB SERPL-MCNC: 0.9 MG/DL
BUN SERPL-MCNC: 29 MG/DL
CALCIUM SERPL-MCNC: 9.9 MG/DL
CHLORIDE SERPL-SCNC: 106 MMOL/L
CHOLEST SERPL-MCNC: 184 MG/DL
CHOLEST/HDLC SERPL: 3.9 RATIO
CO2 SERPL-SCNC: 23 MMOL/L
CREAT SERPL-MCNC: 1.84 MG/DL
EOSINOPHIL # BLD AUTO: 0.45 K/UL
EOSINOPHIL NFR BLD AUTO: 8.8 %
ESTIMATED AVERAGE GLUCOSE: 180 MG/DL
GLUCOSE SERPL-MCNC: 194 MG/DL
HBA1C MFR BLD HPLC: 7.9 %
HCT VFR BLD CALC: 41.5 %
HDLC SERPL-MCNC: 47 MG/DL
HGB BLD-MCNC: 13.2 G/DL
IMM GRANULOCYTES NFR BLD AUTO: 0.2 %
INR PPP: 2.52 RATIO
LDLC SERPL CALC-MCNC: 120 MG/DL
LYMPHOCYTES # BLD AUTO: 1.03 K/UL
LYMPHOCYTES NFR BLD AUTO: 20.1 %
MAN DIFF?: NORMAL
MCHC RBC-ENTMCNC: 31.1 PG
MCHC RBC-ENTMCNC: 31.8 GM/DL
MCV RBC AUTO: 97.9 FL
MONOCYTES # BLD AUTO: 0.46 K/UL
MONOCYTES NFR BLD AUTO: 9 %
NEUTROPHILS # BLD AUTO: 3.12 K/UL
NEUTROPHILS NFR BLD AUTO: 60.7 %
PLATELET # BLD AUTO: 131 K/UL
POTASSIUM SERPL-SCNC: 4 MMOL/L
PROT SERPL-MCNC: 7 G/DL
PT BLD: 29.3 SEC
RBC # BLD: 4.24 M/UL
RBC # FLD: 14.9 %
SODIUM SERPL-SCNC: 142 MMOL/L
TRIGL SERPL-MCNC: 83 MG/DL
TSH SERPL-ACNC: 12 UIU/ML
WBC # FLD AUTO: 5.13 K/UL

## 2019-12-12 ENCOUNTER — OTHER (OUTPATIENT)
Age: 79
End: 2019-12-12

## 2019-12-16 ENCOUNTER — APPOINTMENT (OUTPATIENT)
Dept: INTERNAL MEDICINE | Facility: CLINIC | Age: 79
End: 2019-12-16

## 2019-12-18 ENCOUNTER — APPOINTMENT (OUTPATIENT)
Dept: INTERNAL MEDICINE | Facility: CLINIC | Age: 79
End: 2019-12-18
Payer: MEDICARE

## 2019-12-18 VITALS
OXYGEN SATURATION: 99 % | HEIGHT: 72 IN | TEMPERATURE: 208.22 F | WEIGHT: 162 LBS | HEART RATE: 86 BPM | BODY MASS INDEX: 21.94 KG/M2

## 2019-12-18 VITALS — DIASTOLIC BLOOD PRESSURE: 70 MMHG | SYSTOLIC BLOOD PRESSURE: 118 MMHG

## 2019-12-18 DIAGNOSIS — C44.529 SQUAMOUS CELL CARCINOMA OF SKIN OF OTHER PART OF TRUNK: ICD-10-CM

## 2019-12-18 PROCEDURE — 99213 OFFICE O/P EST LOW 20 MIN: CPT | Mod: 25

## 2019-12-18 PROCEDURE — 36415 COLL VENOUS BLD VENIPUNCTURE: CPT

## 2019-12-18 NOTE — REVIEW OF SYSTEMS
[Fever] : no fever [Chills] : no chills [Chest Pain] : no chest pain [Palpitations] : no palpitations [Wheezing] : no wheezing [Shortness Of Breath] : no shortness of breath [Dyspnea on Exertion] : not dyspnea on exertion [Headache] : no headache [Confusion] : no confusion [Dizziness] : no dizziness [Unsteady Walk] : no ataxia

## 2019-12-18 NOTE — PHYSICAL EXAM
[No Acute Distress] : no acute distress [Normal Sclera/Conjunctiva] : normal sclera/conjunctiva [Normal Oropharynx] : the oropharynx was normal [Normal Outer Ear/Nose] : the outer ears and nose were normal in appearance [No JVD] : no jugular venous distention [No Respiratory Distress] : no respiratory distress  [No Edema] : there was no peripheral edema [No Accessory Muscle Use] : no accessory muscle use [No Extremity Clubbing/Cyanosis] : no extremity clubbing/cyanosis [No Focal Deficits] : no focal deficits [de-identified] : vr 62 [de-identified] : unchanged neurologically

## 2019-12-18 NOTE — HISTORY OF PRESENT ILLNESS
[de-identified] : Pt had [facility] surgery off coumadin for 7 days  Aware of risk of being off.  WIll restart today  No issues while being off

## 2019-12-19 LAB
INR PPP: 1.36 RATIO
PT BLD: 15.7 SEC

## 2019-12-23 ENCOUNTER — APPOINTMENT (OUTPATIENT)
Dept: INTERNAL MEDICINE | Facility: CLINIC | Age: 79
End: 2019-12-23
Payer: MEDICARE

## 2019-12-23 VITALS
OXYGEN SATURATION: 99 % | TEMPERATURE: 208.4 F | DIASTOLIC BLOOD PRESSURE: 74 MMHG | SYSTOLIC BLOOD PRESSURE: 110 MMHG | HEART RATE: 68 BPM | BODY MASS INDEX: 21.94 KG/M2 | WEIGHT: 162 LBS | HEIGHT: 72 IN

## 2019-12-23 PROCEDURE — 36415 COLL VENOUS BLD VENIPUNCTURE: CPT

## 2019-12-23 PROCEDURE — 99213 OFFICE O/P EST LOW 20 MIN: CPT | Mod: 25

## 2019-12-23 RX ORDER — WARFARIN 2 MG/1
2 TABLET ORAL
Refills: 0 | Status: ACTIVE | COMMUNITY
Start: 2019-12-23

## 2019-12-23 NOTE — HISTORY OF PRESENT ILLNESS
[de-identified] : Pt comes for coumadin check  Has been on 2 mg  No bruising or bleeding  Had episode of diarrhea yesterday with nausea  no abd pain

## 2019-12-23 NOTE — REVIEW OF SYSTEMS
[Fever] : no fever [Palpitations] : no palpitations [Chest Pain] : no chest pain [Chills] : no chills [Shortness Of Breath] : no shortness of breath [Lower Ext Edema] : no lower extremity edema [Wheezing] : no wheezing [Cough] : no cough [Nausea] : nausea [Abdominal Pain] : no abdominal pain [Constipation] : no constipation [Diarrhea] : no diarrhea [Melena] : no melena

## 2019-12-23 NOTE — PHYSICAL EXAM
[Normal Outer Ear/Nose] : the outer ears and nose were normal in appearance [No Acute Distress] : no acute distress [No JVD] : no jugular venous distention [No Respiratory Distress] : no respiratory distress  [No Lymphadenopathy] : no lymphadenopathy [No Accessory Muscle Use] : no accessory muscle use [Regular Rhythm] : with a regular rhythm [No Edema] : there was no peripheral edema [No Extremity Clubbing/Cyanosis] : no extremity clubbing/cyanosis [Non-distended] : non-distended [Non Tender] : non-tender [de-identified] : vr 60 [No Masses] : no abdominal mass palpated

## 2019-12-26 LAB
INR PPP: 2.16 RATIO
PT BLD: 25 SEC

## 2020-01-01 ENCOUNTER — APPOINTMENT (OUTPATIENT)
Dept: PULMONOLOGY | Facility: CLINIC | Age: 80
End: 2020-01-01

## 2020-01-01 ENCOUNTER — RX RENEWAL (OUTPATIENT)
Age: 80
End: 2020-01-01

## 2020-01-01 ENCOUNTER — APPOINTMENT (OUTPATIENT)
Dept: INTERNAL MEDICINE | Facility: CLINIC | Age: 80
End: 2020-01-01
Payer: MEDICARE

## 2020-01-01 ENCOUNTER — APPOINTMENT (OUTPATIENT)
Dept: INTERNAL MEDICINE | Facility: CLINIC | Age: 80
End: 2020-01-01

## 2020-01-01 VITALS
OXYGEN SATURATION: 98 % | DIASTOLIC BLOOD PRESSURE: 79 MMHG | HEART RATE: 68 BPM | HEIGHT: 72 IN | WEIGHT: 144 LBS | SYSTOLIC BLOOD PRESSURE: 129 MMHG | BODY MASS INDEX: 19.5 KG/M2 | RESPIRATION RATE: 18 BRPM

## 2020-01-01 DIAGNOSIS — I50.22 CHRONIC SYSTOLIC (CONGESTIVE) HEART FAILURE: ICD-10-CM

## 2020-01-01 DIAGNOSIS — E11.9 TYPE 2 DIABETES MELLITUS W/OUT COMPLICATIONS: ICD-10-CM

## 2020-01-01 DIAGNOSIS — E03.9 HYPOTHYROIDISM, UNSPECIFIED: ICD-10-CM

## 2020-01-01 DIAGNOSIS — I10 ESSENTIAL (PRIMARY) HYPERTENSION: ICD-10-CM

## 2020-01-01 DIAGNOSIS — I25.5 ISCHEMIC CARDIOMYOPATHY: ICD-10-CM

## 2020-01-01 LAB
ALBUMIN SERPL ELPH-MCNC: 3.9 G/DL
ALP BLD-CCNC: 137 U/L
ALT SERPL-CCNC: 22 U/L
ANION GAP SERPL CALC-SCNC: 17 MMOL/L
AST SERPL-CCNC: 32 U/L
BASOPHILS # BLD AUTO: 0.06 K/UL
BASOPHILS NFR BLD AUTO: 1.1 %
BILIRUB SERPL-MCNC: 1.7 MG/DL
BUN SERPL-MCNC: 19 MG/DL
CALCIUM SERPL-MCNC: 9.8 MG/DL
CHLORIDE SERPL-SCNC: 103 MMOL/L
CHOLEST SERPL-MCNC: 125 MG/DL
CHOLEST/HDLC SERPL: 2.3 RATIO
CO2 SERPL-SCNC: 24 MMOL/L
CREAT SERPL-MCNC: 1.45 MG/DL
EOSINOPHIL # BLD AUTO: 0.21 K/UL
EOSINOPHIL NFR BLD AUTO: 4 %
ESTIMATED AVERAGE GLUCOSE: 166 MG/DL
GLUCOSE SERPL-MCNC: 186 MG/DL
HBA1C MFR BLD HPLC: 7.4 %
HCT VFR BLD CALC: 40.4 %
HDLC SERPL-MCNC: 56 MG/DL
HGB BLD-MCNC: 12.6 G/DL
IMM GRANULOCYTES NFR BLD AUTO: 0.2 %
IRON SATN MFR SERPL: 16 %
IRON SERPL-MCNC: 53 UG/DL
LDLC SERPL CALC-MCNC: 56 MG/DL
LYMPHOCYTES # BLD AUTO: 1.1 K/UL
LYMPHOCYTES NFR BLD AUTO: 20.9 %
MAN DIFF?: NORMAL
MCHC RBC-ENTMCNC: 31 PG
MCHC RBC-ENTMCNC: 31.2 GM/DL
MCV RBC AUTO: 99.3 FL
MONOCYTES # BLD AUTO: 0.41 K/UL
MONOCYTES NFR BLD AUTO: 7.8 %
NEUTROPHILS # BLD AUTO: 3.47 K/UL
NEUTROPHILS NFR BLD AUTO: 66 %
PLATELET # BLD AUTO: 119 K/UL
POTASSIUM SERPL-SCNC: 4.1 MMOL/L
PROT SERPL-MCNC: 7 G/DL
RBC # BLD: 4.07 M/UL
RBC # FLD: 17.1 %
SODIUM SERPL-SCNC: 143 MMOL/L
TIBC SERPL-MCNC: 328 UG/DL
TRIGL SERPL-MCNC: 67 MG/DL
TSH SERPL-ACNC: 59.6 UIU/ML
UIBC SERPL-MCNC: 274 UG/DL
WBC # FLD AUTO: 5.26 K/UL

## 2020-01-01 PROCEDURE — 99214 OFFICE O/P EST MOD 30 MIN: CPT | Mod: 25

## 2020-01-01 PROCEDURE — 36415 COLL VENOUS BLD VENIPUNCTURE: CPT

## 2020-01-01 RX ORDER — MUPIROCIN 20 MG/G
2 OINTMENT TOPICAL 3 TIMES DAILY
Qty: 1 | Refills: 3 | Status: ACTIVE | COMMUNITY
Start: 2020-01-01 | End: 1900-01-01

## 2020-01-01 RX ORDER — LISINOPRIL 20 MG/1
20 TABLET ORAL
Qty: 180 | Refills: 0 | Status: ACTIVE | COMMUNITY
Start: 2019-11-04 | End: 1900-01-01

## 2020-01-01 RX ORDER — WARFARIN 4 MG/1
4 TABLET ORAL DAILY
Qty: 90 | Refills: 0 | Status: ACTIVE | COMMUNITY
Start: 2019-12-02 | End: 1900-01-01

## 2020-01-01 RX ORDER — AMLODIPINE BESYLATE 5 MG/1
5 TABLET ORAL DAILY
Qty: 90 | Refills: 0 | Status: ACTIVE | COMMUNITY
Start: 2019-09-13 | End: 1900-01-01

## 2020-01-01 RX ORDER — METOPROLOL SUCCINATE 25 MG/1
25 TABLET, EXTENDED RELEASE ORAL
Qty: 90 | Refills: 0 | Status: ACTIVE | COMMUNITY
Start: 2019-09-13 | End: 1900-01-01

## 2020-01-01 RX ORDER — LEVOTHYROXINE SODIUM 0.05 MG/1
50 TABLET ORAL DAILY
Qty: 90 | Refills: 1 | Status: ACTIVE | COMMUNITY
Start: 2020-01-17 | End: 1900-01-01

## 2020-01-01 RX ORDER — PANTOPRAZOLE 40 MG/1
40 TABLET, DELAYED RELEASE ORAL
Qty: 30 | Refills: 5 | Status: ACTIVE | COMMUNITY
Start: 1900-01-01 | End: 1900-01-01

## 2020-01-14 ENCOUNTER — APPOINTMENT (OUTPATIENT)
Dept: INTERNAL MEDICINE | Facility: CLINIC | Age: 80
End: 2020-01-14
Payer: MEDICARE

## 2020-01-14 VITALS
HEART RATE: 88 BPM | OXYGEN SATURATION: 96 % | WEIGHT: 144 LBS | BODY MASS INDEX: 19.5 KG/M2 | TEMPERATURE: 209.48 F | HEIGHT: 72 IN

## 2020-01-14 VITALS — SYSTOLIC BLOOD PRESSURE: 112 MMHG | DIASTOLIC BLOOD PRESSURE: 72 MMHG

## 2020-01-14 DIAGNOSIS — E78.00 PURE HYPERCHOLESTEROLEMIA, UNSPECIFIED: ICD-10-CM

## 2020-01-14 DIAGNOSIS — K21.9 GASTRO-ESOPHAGEAL REFLUX DISEASE W/OUT ESOPHAGITIS: ICD-10-CM

## 2020-01-14 DIAGNOSIS — I48.91 UNSPECIFIED ATRIAL FIBRILLATION: ICD-10-CM

## 2020-01-14 PROCEDURE — 99214 OFFICE O/P EST MOD 30 MIN: CPT | Mod: 25

## 2020-01-14 PROCEDURE — 36415 COLL VENOUS BLD VENIPUNCTURE: CPT

## 2020-01-14 NOTE — HEALTH RISK ASSESSMENT
[No] : In the past 12 months have you used drugs other than those required for medical reasons? No [0] : 2) Feeling down, depressed, or hopeless: Not at all (0) [[id number]] : 0

## 2020-01-14 NOTE — REVIEW OF SYSTEMS
[Fever] : no fever [Chills] : no chills [Chest Pain] : no chest pain [Lower Ext Edema] : no lower extremity edema [Palpitations] : no palpitations [Shortness Of Breath] : no shortness of breath [Wheezing] : no wheezing [Cough] : no cough [Abdominal Pain] : no abdominal pain [Headache] : no headache [Depression] : no depression [Memory Loss] : no memory loss

## 2020-01-14 NOTE — PHYSICAL EXAM
[No Acute Distress] : no acute distress [Normal Sclera/Conjunctiva] : normal sclera/conjunctiva [No JVD] : no jugular venous distention [No Lymphadenopathy] : no lymphadenopathy [Normal Outer Ear/Nose] : the outer ears and nose were normal in appearance [Supple] : supple [No Respiratory Distress] : no respiratory distress  [Clear to Auscultation] : lungs were clear to auscultation bilaterally [Normal Rate] : normal rate  [No Accessory Muscle Use] : no accessory muscle use [Soft] : abdomen soft [No Edema] : there was no peripheral edema [No Extremity Clubbing/Cyanosis] : no extremity clubbing/cyanosis [Normal Posterior Cervical Nodes] : no posterior cervical lymphadenopathy [Non Tender] : non-tender [Non-distended] : non-distended [Normal Anterior Cervical Nodes] : no anterior cervical lymphadenopathy [de-identified] : rt 2nd finger surg scars and decreased ROM [de-identified] : severe psoriasis [de-identified] :  vr 60

## 2020-01-14 NOTE — HISTORY OF PRESENT ILLNESS
[de-identified] : Pt comes for FBW  Seeing derm every 5 or 6 weeks  No new lesions biopsied no bleeding

## 2020-01-17 LAB
ALBUMIN SERPL ELPH-MCNC: 4 G/DL
ALP BLD-CCNC: 102 U/L
ALT SERPL-CCNC: 25 U/L
ANION GAP SERPL CALC-SCNC: 12 MMOL/L
AST SERPL-CCNC: 30 U/L
BASOPHILS # BLD AUTO: 0.09 K/UL
BASOPHILS NFR BLD AUTO: 1.6 %
BILIRUB SERPL-MCNC: 1.2 MG/DL
BUN SERPL-MCNC: 24 MG/DL
CALCIUM SERPL-MCNC: 9.9 MG/DL
CHLORIDE SERPL-SCNC: 104 MMOL/L
CHOLEST SERPL-MCNC: 159 MG/DL
CHOLEST/HDLC SERPL: 3.7 RATIO
CO2 SERPL-SCNC: 26 MMOL/L
CREAT SERPL-MCNC: 1.64 MG/DL
EOSINOPHIL # BLD AUTO: 0.77 K/UL
EOSINOPHIL NFR BLD AUTO: 13.4 %
ESTIMATED AVERAGE GLUCOSE: 166 MG/DL
GLUCOSE SERPL-MCNC: 184 MG/DL
HBA1C MFR BLD HPLC: 7.4 %
HCT VFR BLD CALC: 38.9 %
HDLC SERPL-MCNC: 43 MG/DL
HGB BLD-MCNC: 11.7 G/DL
IMM GRANULOCYTES NFR BLD AUTO: 0.2 %
INR PPP: 1.4 RATIO
LDLC SERPL CALC-MCNC: 102 MG/DL
LYMPHOCYTES # BLD AUTO: 1.07 K/UL
LYMPHOCYTES NFR BLD AUTO: 18.6 %
MAN DIFF?: NORMAL
MCHC RBC-ENTMCNC: 30.1 GM/DL
MCHC RBC-ENTMCNC: 30.5 PG
MCV RBC AUTO: 101.6 FL
MONOCYTES # BLD AUTO: 0.49 K/UL
MONOCYTES NFR BLD AUTO: 8.5 %
NEUTROPHILS # BLD AUTO: 3.32 K/UL
NEUTROPHILS NFR BLD AUTO: 57.7 %
PLATELET # BLD AUTO: 131 K/UL
POTASSIUM SERPL-SCNC: 4.3 MMOL/L
PROT SERPL-MCNC: 6.5 G/DL
PT BLD: 16.3 SEC
RBC # BLD: 3.83 M/UL
RBC # FLD: 16.2 %
SODIUM SERPL-SCNC: 142 MMOL/L
TRIGL SERPL-MCNC: 69 MG/DL
TSH SERPL-ACNC: 21 UIU/ML
WBC # FLD AUTO: 5.75 K/UL

## 2020-01-27 ENCOUNTER — APPOINTMENT (OUTPATIENT)
Dept: INTERNAL MEDICINE | Facility: CLINIC | Age: 80
End: 2020-01-27
Payer: MEDICARE

## 2020-01-27 VITALS — HEART RATE: 84 BPM | TEMPERATURE: 207.32 F | OXYGEN SATURATION: 96 %

## 2020-01-27 VITALS — DIASTOLIC BLOOD PRESSURE: 78 MMHG | SYSTOLIC BLOOD PRESSURE: 118 MMHG

## 2020-01-27 DIAGNOSIS — J40 BRONCHITIS, NOT SPECIFIED AS ACUTE OR CHRONIC: ICD-10-CM

## 2020-01-27 PROCEDURE — 99213 OFFICE O/P EST LOW 20 MIN: CPT

## 2020-01-27 RX ORDER — AZITHROMYCIN 250 MG/1
250 TABLET, FILM COATED ORAL
Qty: 1 | Refills: 0 | Status: ACTIVE | COMMUNITY
Start: 2020-01-27 | End: 1900-01-01

## 2020-01-27 RX ORDER — PROMETHAZINE HYDROCHLORIDE 6.25 MG/5ML
6.25 SOLUTION ORAL 3 TIMES DAILY
Qty: 180 | Refills: 0 | Status: ACTIVE | COMMUNITY
Start: 2020-01-27 | End: 1900-01-01

## 2020-01-27 NOTE — REVIEW OF SYSTEMS
[Fever] : no fever [Chills] : no chills [Chest Pain] : no chest pain [Palpitations] : no palpitations [Claudication] : no  leg claudication [Lower Ext Edema] : no lower extremity edema [Wheezing] : no wheezing [Dyspnea on Exertion] : not dyspnea on exertion [Abdominal Pain] : no abdominal pain

## 2020-01-27 NOTE — HISTORY OF PRESENT ILLNESS
[de-identified] : Pt comes for cough and discolored mucous for 3 days  no fever   concerned about flu

## 2020-01-27 NOTE — PHYSICAL EXAM
[No Acute Distress] : no acute distress [Normal Outer Ear/Nose] : the outer ears and nose were normal in appearance [No JVD] : no jugular venous distention [No Lymphadenopathy] : no lymphadenopathy [Normal Rate] : normal rate  [Regular Rhythm] : with a regular rhythm [Normal S1, S2] : normal S1 and S2 [No Edema] : there was no peripheral edema [No Extremity Clubbing/Cyanosis] : no extremity clubbing/cyanosis [de-identified] : dionne rhonchi

## 2020-01-28 LAB — RAPID RVP RESULT: NOT DETECTED

## 2020-01-31 ENCOUNTER — RX RENEWAL (OUTPATIENT)
Age: 80
End: 2020-01-31

## 2020-04-03 PROBLEM — E03.9 HYPOTHYROID: Status: ACTIVE | Noted: 2020-01-17

## 2020-04-03 NOTE — REVIEW OF SYSTEMS
[Lower Ext Edema] : lower extremity edema [Fever] : no fever [Chills] : no chills [Chest Pain] : no chest pain [Palpitations] : no palpitations [Shortness Of Breath] : no shortness of breath [Wheezing] : no wheezing [Cough] : no cough [Abdominal Pain] : no abdominal pain [Joint Pain] : no joint pain [Joint Stiffness] : no joint stiffness [Joint Swelling] : no joint swelling [de-identified] : rt lower leg superficial wound

## 2020-04-03 NOTE — HISTORY OF PRESENT ILLNESS
[FreeTextEntry8] : Pt comes for multiple problems  Recent cardiac cath Dr Jensen  Told to treat medically   Has wound rt lower leg for which he has been using bactroban and dressings.  Has televisit today with cardio to adjust meds

## 2020-04-03 NOTE — PHYSICAL EXAM
[No Acute Distress] : no acute distress [Normal Sclera/Conjunctiva] : normal sclera/conjunctiva [Normal Outer Ear/Nose] : the outer ears and nose were normal in appearance [No JVD] : no jugular venous distention [No Respiratory Distress] : no respiratory distress  [No Accessory Muscle Use] : no accessory muscle use [Clear to Auscultation] : lungs were clear to auscultation bilaterally [Soft] : abdomen soft [Non Tender] : non-tender [Normal Posterior Cervical Nodes] : no posterior cervical lymphadenopathy [Normal Anterior Cervical Nodes] : no anterior cervical lymphadenopathy [No Spinal Tenderness] : no spinal tenderness [No Joint Swelling] : no joint swelling [de-identified] : vr 62 [de-identified] : unchanged edema [de-identified] : psoriasis  rt lower leg wound cleand and dressing applied  no sign infection

## 2020-04-17 NOTE — ED ADULT NURSE NOTE - NS_SISCREENINGSR_GEN_ALL_ED
Requested Prescriptions     Pending Prescriptions Disp Refills   • TEMAZEPAM 30 MG Oral Cap [Pharmacy Med Name: TEMAZEPAM 30MG CAPSULES] 30 capsule 0     Sig: TAKE 1 CAPSULE BY MOUTH NIGHTLY AS NEEDED FOR SLEEP       LOV: 2/14/20  RTC: 6 months  Filled: 10
Negative

## 2020-11-11 NOTE — PROGRESS NOTE ADULT - SUBJECTIVE AND OBJECTIVE BOX
[de-identified] : There are extensive lab tests and consultation reports by Dr. Jaime and I noticed poor compliance and uncontrolled diabetes but fairly good control of his cholesterol.  He was prescribed aspirin in addition to clopidogrel but he is noncompliant with aspirin.  There is no exercise habit but he is a  by profession.  In the review of his medication he has been adequately treated but because of his noncompliant nature in his labs are not totally under good control. [facility] -- Yarely Morrison, Frances, Regla, Jerson Hawley Savella  Office # [phone]    Follow Up:  Afib on Coumadin, Preop Eval    Subjective/Observations: Awake and alert no complaints.      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 1000 milliLiter(s) (50 mL/Hr) IV Continuous <Continuous>  dextrose 50% Injectable 12.5 Gram(s) IV Push once  dextrose 50% Injectable 25 Gram(s) IV Push once  dextrose 50% Injectable 25 Gram(s) IV Push once  digoxin     Tablet 0.125 milliGRAM(s) Oral every other day  insulin lispro (HumaLOG) corrective regimen sliding scale   SubCutaneous three times a day before meals  insulin lispro (HumaLOG) corrective regimen sliding scale   SubCutaneous at bedtime  pantoprazole    Tablet 40 milliGRAM(s) Oral before breakfast  saccharomyces boulardii 250 milliGRAM(s) Oral two times a day  sodium chloride 0.9%. 1000 milliLiter(s) (50 mL/Hr) IV Continuous <Continuous>  vancomycin  IVPB 1000 milliGRAM(s) IV Intermittent every 24 hours    MEDICATIONS  (PRN):  acetaminophen   Tablet .. 650 milliGRAM(s) Oral every 6 hours PRN Mild Pain (1 - 3)  dextrose 40% Gel 15 Gram(s) Oral once PRN Blood Glucose LESS THAN 70 milliGRAM(s)/deciliter  glucagon  Injectable 1 milliGRAM(s) IntraMuscular once PRN Glucose LESS THAN 70 milligrams/deciliter    Allergies    No Known Allergies    Intolerances    Vital Signs Last 24 Hrs  T(C): 36.6 (07 Aug 2019 13:01), Max: 36.6 (07 Aug 2019 13:01)  T(F): 97.8 (07 Aug 2019 13:01), Max: 97.8 (07 Aug 2019 13:01)  HR: 61 (07 Aug 2019 13:01) (59 - 64)  BP: 122/68 (07 Aug 2019 13:01) (94/56 - 124/66)  BP(mean): --  RR: 19 (07 Aug 2019 13:01) (17 - 19)  SpO2: 98% (07 Aug 2019 13:01) (96% - 98%)  I&O's Summary    06 Aug 2019 07:01  -  07 Aug 2019 07:00  --------------------------------------------------------  IN: 1420 mL / OUT: 0 mL / NET: 1420 mL    07 Aug 2019 07:01  -  07 Aug 2019 16:27  --------------------------------------------------------  IN: 220 mL / OUT: 0 mL / NET: 220 mL      PHYSICAL EXAM:  TELE: Not on tele  Constitutional: NAD, awake and alert, well-developed  HEENT: Moist Mucous Membranes, Anicteric  Pulmonary: Non-labored, breath sounds are clear bilaterally, No wheezing, rales or rhonchi  Cardiovascular: Regular, S1 and S2, + murmurs.  + rubs, gallops or clicks  Gastrointestinal: Bowel Sounds present, soft, nontender.   Lymph: No peripheral edema. No lymphadenopathy.  Skin: No visible rashes.  right index finger ulcer with dressing dry and intact  Psych:  Mood & affect appropriate  LABS: All Labs Reviewed:                        13.3   6.73  )-----------( 128      ( 07 Aug 2019 08:22 )             40.3                         12.8   5.94  )-----------( 120      ( 06 Aug 2019 07:42 )             38.5                         14.7   5.91  )-----------( 160      ( 05 Aug 2019 18:22 )             45.4     07 Aug 2019 08:22    140    |  107    |  25     ----------------------------<  173    3.9     |  23     |  1.50   05 Aug 2019 18:22    140    |  104    |  29     ----------------------------<  177    4.3     |  28     |  1.60     Ca    9.7        07 Aug 2019 08:22  Ca    9.7        05 Aug 2019 18:22    TPro  8.3    /  Alb  3.6    /  TBili  1.0    /  DBili  x      /  AST  35     /  ALT  38     /  AlkPhos  123    05 Aug 2019 18:22    PT/INR - ( 07 Aug 2019 08:22 )   PT: 17.1 sec;   INR: 1.48 ratio    PTT - ( 05 Aug 2019 18:22 )  PTT:58.3 sec    < from: Xray Chest 1 View AP- PORTABLE-Urgent (10.19.17 @ 19:09) >    EXAM:  CHEST-PORTABLE URGENT                            PROCEDURE DATE:  10/19/2017      INTERPRETATION:    CLINICAL INDICATION: Status post lead revision and generator change of   the cardiac devices.    TECHNIQUE: Frontal view of the chest.    COMPARISON: Frontal chest radiograph from 8/29/2007.    FINDINGS:   Status post sternotomy.  Cardiac devices on the bilateral chest walls.  No pneumothorax.    Cardiac size is within normal limits.   The lungs are clear.   No pleural effusion.   Degenerative spine.     IMPRESSION:   No pneumothorax.  Clear lungs.     JESSE MELENDEZ M.D., RADIOLOGY RESIDENT  This document has been electronically signed.  ANNETTA CARR M.D. ATTENDING RADIOLOGIST  This document has been electronically signed. Oct 20 2017 11:13AM     < end of copied text >    < from: 12 Lead ECG (08.05.19 @ 19:00) >    Ventricular Rate 64 BPM    Atrial Rate 69 BPM    QRS Duration 166 ms    Q-T Interval 536 ms    QTC Calculation(Bezet) 552 ms    R Axis 5 degrees    T Axis 107 degrees    Diagnosis Line Ventricular-paced rhythm with frequent premature ventricular complexes  Abnormal ECG  No previous ECGs available  Confirmed by nicole Garland ([id number]) on 8/6/2019 5:01:45 PM    < end of copied text >

## 2021-09-22 NOTE — ED PROVIDER NOTE - NS_EDPROVIDERDISPOUSERTYPE_ED_A_ED
Left detailed message on patient's vcmailbox with instructions for scheduling an appointment next week w/APN April [place]   Scribe Attestation (For Scribes USE Only)... Attending Attestation (For Attendings USE Only).../Scribe Attestation (For Scribes USE Only)...

## 2024-02-07 NOTE — PROGRESS NOTE ADULT - PROBLEM SELECTOR PROBLEM 5
EMERGENCY DEPARTMENT ENCOUNTER    Pt Name: Jose Kaplan  MRN: [id number]  Birthdate 1989  Date of evaluation: 2/7/24  CHIEF COMPLAINT       Chief Complaint   Patient presents with    Hand Pain     R hand pain. Ongoing     HISTORY OF PRESENT ILLNESS   HPI   Patient is a 34-year-old male who presents to the ED stating he would like to get out of the cold, rest for a little while, would like alprazolam for his anxiety and Tylenol for chronic hand pain.  No other issues at this time    REVIEW OF SYSTEMS     Review of Systems  All other systems reviewed and are negative.    PASTMEDICAL HISTORY     Past Medical History:   Diagnosis Date    Anxiety     Arthritis     Asthma     Bipolar I disorder, most recent episode (or current) depressed, unspecified 9/12/2014    Clostridium difficile infection     COPD (chronic obstructive pulmonary disease) ([facility])     Depression     Disease of blood and blood forming organ     Eczema     Fracture, metacarpal     R 4th and 5th    Gastric ulcer     Gastritis 06/13/2018    GERD (gastroesophageal reflux disease)     GI bleed     H. 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[facility])     Substance abuse ([facility])     Hx of opiates, benzos, cocaine, alcohol abuse    Suicidal ideation     Suicide attempt by hanging ([facility])     Tobacco dependence     Ulcerative colitis ([facility])     UTI (urinary tract infection)      Past Problem List  Patient Active Problem List   Diagnosis Code    Opioid abuse ([facility]) F11.10    Noncompliance Z91.199    Rectal bleeding K62.5    Smoker F17.200    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    
Hyperlipidemia
Supratherapeutic INR
Supratherapeutic INR

## 2024-06-04 NOTE — PATIENT PROFILE ADULT - PSYCHOSOCIAL CONCERNS
What Type Of Note Output Would You Prefer (Optional)?: Standard Output Hpi Title: Evaluation of a Skin Lesion How Severe Are Your Spot(S)?: mild Have Your Spot(S) Been Treated In The Past?: has not been treated none

## 2024-07-01 NOTE — ED PROVIDER NOTE - NOTES
General Patient Message: marcus called in from [facility] checking the status of the physician plan of care. Please follow up.  Caller Information         Type Contact Phone/Fax    07/01/2024 10:17 AM CDT Phone (Incoming) marcus (Other) [phone]     [employer]            Alternative phone number: no    Can a detailed message be left? Yes - Voicemail      Patient has been advised the message will be addressed within 2-3 business days.            
Per TE 6/17/24- Faxed POC on 6/24/24.     LVM with [facility] for clarification of which physician order is needed. To call clinic back.   
requests vanco/zosyn and admit to med

## 2024-10-25 NOTE — PROGRESS NOTE ADULT - PROVIDER SPECIALTY LIST ADULT
Cardiology Patient is currenly on trulicity. And asking to switch to ozempic because no changes with trulicty noticed. Please advise.

## 2025-05-20 NOTE — ED PROVIDER NOTE - CARE PLAN
Next back, AURELIANO Lopez, KEESHA  05/20/25 0915     Principal Discharge DX:	Pacemaker malfunction, initial encounter